# Patient Record
Sex: FEMALE | Race: WHITE | Employment: FULL TIME | URBAN - METROPOLITAN AREA
[De-identification: names, ages, dates, MRNs, and addresses within clinical notes are randomized per-mention and may not be internally consistent; named-entity substitution may affect disease eponyms.]

---

## 2019-05-08 ENCOUNTER — HOSPITAL ENCOUNTER (OUTPATIENT)
Dept: ULTRASOUND IMAGING | Age: 29
Discharge: HOME | End: 2019-05-08
Attending: NURSE PRACTITIONER
Payer: COMMERCIAL

## 2019-05-08 DIAGNOSIS — N85.2 HYPERTROPHY OF UTERUS: ICD-10-CM

## 2019-05-08 PROCEDURE — 76830 TRANSVAGINAL US NON-OB: CPT

## 2019-05-08 PROCEDURE — 76856 US EXAM PELVIC COMPLETE: CPT

## 2021-11-21 ENCOUNTER — TRANSCRIBE ORDER (OUTPATIENT)
Dept: REGISTRATION | Age: 31
End: 2021-11-21

## 2021-11-21 DIAGNOSIS — Z01.812 PRE-PROCEDURE LAB EXAM: Primary | ICD-10-CM

## 2021-11-26 ENCOUNTER — HOSPITAL ENCOUNTER (OUTPATIENT)
Dept: PREADMISSION TESTING | Age: 31
Discharge: HOME | End: 2021-11-26
Attending: OBSTETRICS & GYNECOLOGY
Payer: COMMERCIAL

## 2021-11-26 DIAGNOSIS — Z01.812 PRE-PROCEDURE LAB EXAM: ICD-10-CM

## 2021-11-26 PROCEDURE — U0005 INFEC AGEN DETEC AMPLI PROBE: HCPCS

## 2021-11-28 LAB
SARS-COV-2, XPLCVT: NOT DETECTED
SOURCE, COVRS: NORMAL

## 2021-11-30 ENCOUNTER — ANESTHESIA EVENT (OUTPATIENT)
Dept: SURGERY | Age: 31
End: 2021-11-30
Payer: COMMERCIAL

## 2021-12-01 ENCOUNTER — ANESTHESIA (OUTPATIENT)
Dept: SURGERY | Age: 31
End: 2021-12-01
Payer: COMMERCIAL

## 2021-12-01 ENCOUNTER — HOSPITAL ENCOUNTER (OUTPATIENT)
Age: 31
Setting detail: OUTPATIENT SURGERY
Discharge: HOME | End: 2021-12-01
Attending: OBSTETRICS & GYNECOLOGY | Admitting: OBSTETRICS & GYNECOLOGY
Payer: COMMERCIAL

## 2021-12-01 VITALS
TEMPERATURE: 98 F | BODY MASS INDEX: 39.99 KG/M2 | HEART RATE: 66 BPM | WEIGHT: 240 LBS | DIASTOLIC BLOOD PRESSURE: 63 MMHG | HEIGHT: 65 IN | RESPIRATION RATE: 17 BRPM | OXYGEN SATURATION: 100 % | SYSTOLIC BLOOD PRESSURE: 113 MMHG

## 2021-12-01 LAB
HCG UR QL: NEGATIVE
HGB BLD-MCNC: 10.5 G/DL (ref 11.5–16)

## 2021-12-01 PROCEDURE — 88305 TISSUE EXAM BY PATHOLOGIST: CPT

## 2021-12-01 PROCEDURE — 76210000006 HC OR PH I REC 0.5 TO 1 HR: Performed by: OBSTETRICS & GYNECOLOGY

## 2021-12-01 PROCEDURE — 77030026236 HC DEV TISS RMVL MYOSUR HOLO -G1: Performed by: OBSTETRICS & GYNECOLOGY

## 2021-12-01 PROCEDURE — 77030040922 HC BLNKT HYPOTHRM STRY -A

## 2021-12-01 PROCEDURE — 85018 HEMOGLOBIN: CPT

## 2021-12-01 PROCEDURE — 74011250637 HC RX REV CODE- 250/637: Performed by: ANESTHESIOLOGY

## 2021-12-01 PROCEDURE — 74011250636 HC RX REV CODE- 250/636: Performed by: ANESTHESIOLOGY

## 2021-12-01 PROCEDURE — 77030003666 HC NDL SPINAL BD -A: Performed by: OBSTETRICS & GYNECOLOGY

## 2021-12-01 PROCEDURE — 76010000138 HC OR TIME 0.5 TO 1 HR: Performed by: OBSTETRICS & GYNECOLOGY

## 2021-12-01 PROCEDURE — 77030040361 HC SLV COMPR DVT MDII -B: Performed by: OBSTETRICS & GYNECOLOGY

## 2021-12-01 PROCEDURE — 81025 URINE PREGNANCY TEST: CPT

## 2021-12-01 PROCEDURE — 76210000021 HC REC RM PH II 0.5 TO 1 HR: Performed by: OBSTETRICS & GYNECOLOGY

## 2021-12-01 PROCEDURE — 77030019905 HC CATH URETH INTMIT MDII -A: Performed by: OBSTETRICS & GYNECOLOGY

## 2021-12-01 PROCEDURE — 74011000250 HC RX REV CODE- 250: Performed by: OBSTETRICS & GYNECOLOGY

## 2021-12-01 PROCEDURE — 74011000250 HC RX REV CODE- 250: Performed by: NURSE ANESTHETIST, CERTIFIED REGISTERED

## 2021-12-01 PROCEDURE — 74011250636 HC RX REV CODE- 250/636: Performed by: NURSE ANESTHETIST, CERTIFIED REGISTERED

## 2021-12-01 PROCEDURE — 77030041423 HC SYST FLUID MNGMT FLUENT HOLO -D: Performed by: OBSTETRICS & GYNECOLOGY

## 2021-12-01 PROCEDURE — 2709999900 HC NON-CHARGEABLE SUPPLY: Performed by: OBSTETRICS & GYNECOLOGY

## 2021-12-01 PROCEDURE — 76060000033 HC ANESTHESIA 1 TO 1.5 HR: Performed by: OBSTETRICS & GYNECOLOGY

## 2021-12-01 RX ORDER — OXYCODONE AND ACETAMINOPHEN 5; 325 MG/1; MG/1
1 TABLET ORAL AS NEEDED
Status: DISCONTINUED | OUTPATIENT
Start: 2021-12-01 | End: 2021-12-01 | Stop reason: HOSPADM

## 2021-12-01 RX ORDER — PROPOFOL 10 MG/ML
INJECTION, EMULSION INTRAVENOUS AS NEEDED
Status: DISCONTINUED | OUTPATIENT
Start: 2021-12-01 | End: 2021-12-01 | Stop reason: HOSPADM

## 2021-12-01 RX ORDER — FENTANYL CITRATE 50 UG/ML
50 INJECTION, SOLUTION INTRAMUSCULAR; INTRAVENOUS AS NEEDED
Status: DISCONTINUED | OUTPATIENT
Start: 2021-12-01 | End: 2021-12-01 | Stop reason: HOSPADM

## 2021-12-01 RX ORDER — SODIUM CHLORIDE 0.9 % (FLUSH) 0.9 %
5-40 SYRINGE (ML) INJECTION AS NEEDED
Status: DISCONTINUED | OUTPATIENT
Start: 2021-12-01 | End: 2021-12-01 | Stop reason: HOSPADM

## 2021-12-01 RX ORDER — KETOROLAC TROMETHAMINE 30 MG/ML
INJECTION, SOLUTION INTRAMUSCULAR; INTRAVENOUS AS NEEDED
Status: DISCONTINUED | OUTPATIENT
Start: 2021-12-01 | End: 2021-12-01 | Stop reason: HOSPADM

## 2021-12-01 RX ORDER — MIDAZOLAM HYDROCHLORIDE 1 MG/ML
1 INJECTION, SOLUTION INTRAMUSCULAR; INTRAVENOUS AS NEEDED
Status: DISCONTINUED | OUTPATIENT
Start: 2021-12-01 | End: 2021-12-01 | Stop reason: HOSPADM

## 2021-12-01 RX ORDER — SODIUM CHLORIDE 9 MG/ML
50 INJECTION, SOLUTION INTRAVENOUS CONTINUOUS
Status: DISCONTINUED | OUTPATIENT
Start: 2021-12-01 | End: 2021-12-01 | Stop reason: HOSPADM

## 2021-12-01 RX ORDER — ONDANSETRON 2 MG/ML
INJECTION INTRAMUSCULAR; INTRAVENOUS AS NEEDED
Status: DISCONTINUED | OUTPATIENT
Start: 2021-12-01 | End: 2021-12-01 | Stop reason: HOSPADM

## 2021-12-01 RX ORDER — BUPIVACAINE HYDROCHLORIDE AND EPINEPHRINE 5; 5 MG/ML; UG/ML
INJECTION, SOLUTION EPIDURAL; INTRACAUDAL; PERINEURAL AS NEEDED
Status: DISCONTINUED | OUTPATIENT
Start: 2021-12-01 | End: 2021-12-01 | Stop reason: HOSPADM

## 2021-12-01 RX ORDER — EPHEDRINE SULFATE/0.9% NACL/PF 50 MG/5 ML
5 SYRINGE (ML) INTRAVENOUS AS NEEDED
Status: DISCONTINUED | OUTPATIENT
Start: 2021-12-01 | End: 2021-12-01 | Stop reason: HOSPADM

## 2021-12-01 RX ORDER — ONDANSETRON 2 MG/ML
4 INJECTION INTRAMUSCULAR; INTRAVENOUS AS NEEDED
Status: DISCONTINUED | OUTPATIENT
Start: 2021-12-01 | End: 2021-12-01 | Stop reason: HOSPADM

## 2021-12-01 RX ORDER — SODIUM CHLORIDE 0.9 % (FLUSH) 0.9 %
5-40 SYRINGE (ML) INJECTION EVERY 8 HOURS
Status: DISCONTINUED | OUTPATIENT
Start: 2021-12-01 | End: 2021-12-01 | Stop reason: HOSPADM

## 2021-12-01 RX ORDER — SODIUM CHLORIDE 9 MG/ML
1000 INJECTION, SOLUTION INTRAVENOUS CONTINUOUS
Status: DISCONTINUED | OUTPATIENT
Start: 2021-12-01 | End: 2021-12-01 | Stop reason: HOSPADM

## 2021-12-01 RX ORDER — MIDAZOLAM HYDROCHLORIDE 1 MG/ML
INJECTION, SOLUTION INTRAMUSCULAR; INTRAVENOUS AS NEEDED
Status: DISCONTINUED | OUTPATIENT
Start: 2021-12-01 | End: 2021-12-01 | Stop reason: HOSPADM

## 2021-12-01 RX ORDER — FENTANYL CITRATE 50 UG/ML
25 INJECTION, SOLUTION INTRAMUSCULAR; INTRAVENOUS
Status: DISCONTINUED | OUTPATIENT
Start: 2021-12-01 | End: 2021-12-01 | Stop reason: HOSPADM

## 2021-12-01 RX ORDER — DEXAMETHASONE SODIUM PHOSPHATE 4 MG/ML
INJECTION, SOLUTION INTRA-ARTICULAR; INTRALESIONAL; INTRAMUSCULAR; INTRAVENOUS; SOFT TISSUE AS NEEDED
Status: DISCONTINUED | OUTPATIENT
Start: 2021-12-01 | End: 2021-12-01 | Stop reason: HOSPADM

## 2021-12-01 RX ORDER — LIDOCAINE HYDROCHLORIDE 10 MG/ML
0.1 INJECTION, SOLUTION EPIDURAL; INFILTRATION; INTRACAUDAL; PERINEURAL AS NEEDED
Status: DISCONTINUED | OUTPATIENT
Start: 2021-12-01 | End: 2021-12-01 | Stop reason: HOSPADM

## 2021-12-01 RX ORDER — LIDOCAINE HYDROCHLORIDE 20 MG/ML
INJECTION, SOLUTION EPIDURAL; INFILTRATION; INTRACAUDAL; PERINEURAL AS NEEDED
Status: DISCONTINUED | OUTPATIENT
Start: 2021-12-01 | End: 2021-12-01 | Stop reason: HOSPADM

## 2021-12-01 RX ORDER — SODIUM CHLORIDE, SODIUM LACTATE, POTASSIUM CHLORIDE, CALCIUM CHLORIDE 600; 310; 30; 20 MG/100ML; MG/100ML; MG/100ML; MG/100ML
125 INJECTION, SOLUTION INTRAVENOUS CONTINUOUS
Status: DISCONTINUED | OUTPATIENT
Start: 2021-12-01 | End: 2021-12-01 | Stop reason: HOSPADM

## 2021-12-01 RX ORDER — PROPOFOL 10 MG/ML
INJECTION, EMULSION INTRAVENOUS
Status: DISCONTINUED | OUTPATIENT
Start: 2021-12-01 | End: 2021-12-01 | Stop reason: HOSPADM

## 2021-12-01 RX ORDER — MIDAZOLAM HYDROCHLORIDE 1 MG/ML
0.5 INJECTION, SOLUTION INTRAMUSCULAR; INTRAVENOUS
Status: DISCONTINUED | OUTPATIENT
Start: 2021-12-01 | End: 2021-12-01 | Stop reason: HOSPADM

## 2021-12-01 RX ORDER — SODIUM CHLORIDE, SODIUM LACTATE, POTASSIUM CHLORIDE, CALCIUM CHLORIDE 600; 310; 30; 20 MG/100ML; MG/100ML; MG/100ML; MG/100ML
INJECTION, SOLUTION INTRAVENOUS
Status: DISCONTINUED | OUTPATIENT
Start: 2021-12-01 | End: 2021-12-01 | Stop reason: HOSPADM

## 2021-12-01 RX ORDER — DIPHENHYDRAMINE HYDROCHLORIDE 50 MG/ML
12.5 INJECTION, SOLUTION INTRAMUSCULAR; INTRAVENOUS AS NEEDED
Status: DISCONTINUED | OUTPATIENT
Start: 2021-12-01 | End: 2021-12-01 | Stop reason: HOSPADM

## 2021-12-01 RX ORDER — PHENYLEPHRINE HCL IN 0.9% NACL 0.4MG/10ML
SYRINGE (ML) INTRAVENOUS AS NEEDED
Status: DISCONTINUED | OUTPATIENT
Start: 2021-12-01 | End: 2021-12-01 | Stop reason: HOSPADM

## 2021-12-01 RX ORDER — HYDROMORPHONE HYDROCHLORIDE 1 MG/ML
0.2 INJECTION, SOLUTION INTRAMUSCULAR; INTRAVENOUS; SUBCUTANEOUS
Status: DISCONTINUED | OUTPATIENT
Start: 2021-12-01 | End: 2021-12-01 | Stop reason: HOSPADM

## 2021-12-01 RX ORDER — ACETAMINOPHEN 325 MG/1
650 TABLET ORAL ONCE
Status: COMPLETED | OUTPATIENT
Start: 2021-12-01 | End: 2021-12-01

## 2021-12-01 RX ORDER — MORPHINE SULFATE 2 MG/ML
2 INJECTION, SOLUTION INTRAMUSCULAR; INTRAVENOUS
Status: DISCONTINUED | OUTPATIENT
Start: 2021-12-01 | End: 2021-12-01 | Stop reason: HOSPADM

## 2021-12-01 RX ADMIN — LIDOCAINE HYDROCHLORIDE 40 MG: 20 INJECTION, SOLUTION EPIDURAL; INFILTRATION; INTRACAUDAL; PERINEURAL at 11:18

## 2021-12-01 RX ADMIN — ACETAMINOPHEN 650 MG: 325 TABLET ORAL at 10:41

## 2021-12-01 RX ADMIN — PROPOFOL 10 MG: 10 INJECTION, EMULSION INTRAVENOUS at 11:22

## 2021-12-01 RX ADMIN — DEXAMETHASONE SODIUM PHOSPHATE 4 MG: 4 INJECTION, SOLUTION INTRAMUSCULAR; INTRAVENOUS at 11:38

## 2021-12-01 RX ADMIN — Medication 80 MCG: at 11:53

## 2021-12-01 RX ADMIN — SODIUM CHLORIDE, SODIUM LACTATE, POTASSIUM CHLORIDE, AND CALCIUM CHLORIDE: 600; 310; 30; 20 INJECTION, SOLUTION INTRAVENOUS at 11:13

## 2021-12-01 RX ADMIN — PROPOFOL 20 MG: 10 INJECTION, EMULSION INTRAVENOUS at 11:32

## 2021-12-01 RX ADMIN — KETOROLAC TROMETHAMINE 30 MG: 30 INJECTION, SOLUTION INTRAMUSCULAR; INTRAVENOUS at 12:02

## 2021-12-01 RX ADMIN — PROPOFOL 100 MCG/KG/MIN: 10 INJECTION, EMULSION INTRAVENOUS at 11:19

## 2021-12-01 RX ADMIN — PROPOFOL 40 MG: 10 INJECTION, EMULSION INTRAVENOUS at 11:18

## 2021-12-01 RX ADMIN — Medication 40 MCG: at 11:58

## 2021-12-01 RX ADMIN — MIDAZOLAM 2 MG: 1 INJECTION INTRAMUSCULAR; INTRAVENOUS at 11:13

## 2021-12-01 RX ADMIN — PROPOFOL 75 MG: 10 INJECTION, EMULSION INTRAVENOUS at 12:01

## 2021-12-01 RX ADMIN — ONDANSETRON HYDROCHLORIDE 4 MG: 2 INJECTION, SOLUTION INTRAMUSCULAR; INTRAVENOUS at 11:59

## 2021-12-01 RX ADMIN — SODIUM CHLORIDE, POTASSIUM CHLORIDE, SODIUM LACTATE AND CALCIUM CHLORIDE 125 ML/HR: 600; 310; 30; 20 INJECTION, SOLUTION INTRAVENOUS at 10:32

## 2021-12-01 NOTE — H&P
Gynecology History and Physical    Name: Latasha Harden MRN: [id number] SSN: [id number]    YOB: 1990  Age: 36 y.o. Sex: female       Subjective:      Chief complaint:  Abnormal uterine bleeding    Oni Richardson is a 36 y.o.  female with a history of abnormal uterine bleeding and recurrent miscarriage. She has failed OCPs and lysteda. An ultrasound last month showed mildly thickened endometrium at 14mm but no clear intracavitary lesion (although poor SIS fill was noted). She is interested in conception in the near future. She is admitted for Procedure(s) (LRB):  HYSTEROSCOPY, MYOSURE POSSIBLE DILATION AND CURETTAGE (N/A). The current method of family planning is none. No past medical history on file. Past Surgical History:   Procedure Laterality Date    HX OTHER SURGICAL      HX WISDOM TEETH EXTRACTION       No Known Allergies  Prior to Admission medications    Medication Sig Start Date End Date Taking? Authorizing Provider   PNV no.24-iron-folic acid-dha -426 mg-mcg-mg cmpk Take  by mouth. Provider, Historical      No family history on file.   OB History        3    Para   1    Term   1            AB   2    Living   1       SAB   2    IAB        Ectopic        Molar        Multiple   0    Live Births   1              Social History     Socioeconomic History    Marital status:      Spouse name: Not on file    Number of children: Not on file    Years of education: Not on file    Highest education level: Not on file   Occupational History    Not on file   Tobacco Use    Smoking status: Never Smoker    Smokeless tobacco: Not on file   Substance and Sexual Activity    Alcohol use: No    Drug use: Not on file    Sexual activity: Yes     Partners: Male   Other Topics Concern    Not on file   Social History Narrative    Not on file     Social Determinants of Health     Financial Resource Strain:     Difficulty of Paying Living Expenses: Not on file   Food Insecurity:     Worried About Running Out of Food in the Last Year: Not on file    Sparkle of Food in the Last Year: Not on file   Transportation Needs:     Lack of Transportation (Medical): Not on file    Lack of Transportation (Non-Medical): Not on file   Physical Activity:     Days of Exercise per Week: Not on file    Minutes of Exercise per Session: Not on file   Stress:     Feeling of Stress : Not on file   Social Connections:     Frequency of Communication with Friends and Family: Not on file    Frequency of Social Gatherings with Friends and Family: Not on file    Attends Yazdanism Services: Not on file    Active Member of [address] [place] [address] [employer] or Organizations: Not on file    Attends Club or Organization Meetings: Not on file    Marital Status: Not on file   Intimate Partner Violence:     Fear of Current or Ex-Partner: Not on file    Emotionally Abused: Not on file    Physically Abused: Not on file    Sexually Abused: Not on file   Housing Stability:     Unable to Pay for Housing in the Last Year: Not on file    Number of [place] in the Last Year: Not on file    Unstable Housing in the Last Year: Not on file       Review of Systems:  A comprehensive review of systems was negative except for that written in the History of Present Illness. Objective: There were no vitals filed for this visit. General:  alert, cooperative, no distress, appears stated age   Skin:  Normal.   Eyes: negative   Mouth: MMM no lesions   Lymph Nodes:  Cervical, supraclavicular, and axillary nodes normal.   Lungs:  Normal effort   Heart:  regular rate and rhythm   Abdomen: soft, non-tender.  Bowel sounds normal. No masses,  no organomegaly   CVA:  absent   Genitourinary: exam deferred   Extremities:  extremities normal, atraumatic, no cyanosis or edema   Neurologic:  negative   Psychiatric:  non focal         Assessment:     Persistent AUB despite medical management    Plan:     Procedure(s) (LRB): HYSTEROSCOPY, MYOSURE POSSIBLE DILATION AND CURETTAGE (N/A)  Discussed the risks of surgery including the risks of bleeding, infection, deep vein thrombosis, and surgical injuries to internal organs including but not limited to the bowels, bladder, rectum, and female reproductive organs. The patient understands the risks; any and all questions were answered to the patient's satisfaction. The patient was counseled at length about the risks of josephine Covid-19 during their perioperative period and any recovery window from their procedure. The patient was made aware that josephine Covid-19  may worsen their prognosis for recovering from their procedure and lend to a higher morbidity and/or mortality risk. All material risks, benefits, and reasonable alternatives including postponing the procedure were discussed. The patient does  wish to proceed with the procedure at this time.       Signed By:  Amelie Gallardo MD     December 1, 2021

## 2021-12-01 NOTE — DISCHARGE INSTRUCTIONS
After Care Instructions For Your Hysteroscopy, D&C      1. You may resume your usual diet once the nausea resolves. Initially, try sips of warm fluids and a bland diet. 2. Avoid heavy lifting and straining. Gradually increase your activity. First, try walking and doing light activity around the house. Resume your normal habits if no significant discomfort or bleeding develops. Most women can return to work within one to four days after this procedure. 3. You may take showers. Avoid using a tub bath, swimming pool or hot tub until after your check-up. 4. Do not place anything in your vagina until after your postoperative visit. Do not   douche, use tampons, or have intercourse because this may cause bleeding and   infection. 5. You may initially experience a heavy bloody discharge. This should not be more than your menstrual flow. Over the next several days, the flow should steadily decrease. 6. Typically following the procedure, there is little or no pain. You may feel cramps in your lower abdomen. Tylenol may relieve mild cramping. If pain medication does not improve your symptoms, you should contact your physician. 7. Contact the office if you have excessive bleeding (saturating a pad an hour for two hours or passing large clots). It is also necessary to speak with your physician if you develop chills, a temperature greater than 100.4, difficulty voiding or burning on urination. 8. Your physician may want to see you in the office after your D&C. Please call for an appointment if this has not already been arranged. Our office phone number is [phone].  If appropriate, the microscopic results from your procedure will be discussed at this follow-up visit.          ______________________________________________________________________    Anesthesia Discharge Instructions    After general anesthesia or intervenous sedation, for 24 hours or while taking prescription Narcotics:  · Limit your activities  · Do not drive or operate hazardous machinery  · If you have not urinated within 8 hours after discharge, please contact your surgeon on call. · Do not make important personal or business decisions  · Do not drink alcoholic beverages    Report the following to your surgeon:  · Excessive pain, swelling, redness or odor of or around the surgical area  · Temperature over 100.5 degrees  · Nausea and vomiting lasting longer than 4 hours or if unable to take medication  · Any signs of decreased circulation or nerve impairment to extremity:  Change in color, persistent numbness, tingling, coldness or increased pain. · Any questions call Dr. Vinay Castellon office.

## 2021-12-01 NOTE — BRIEF OP NOTE
Brief Postoperative Note    Patient: Savanah Reyes  YOB: 1990  MRN: [id number]    Date of Procedure: 12/1/2021     Pre-Op Diagnosis: ABNORMAL UTERINE BLEEDING    Post-Op Diagnosis: same with very thickened endometrium    Procedure(s):   HYSTEROSCOPIC myosure directed biopsies, DILATION AND CURETTAGE    Surgeon(s):  Brendan Ruff MD    Surgical Assistant: None    Anesthesia: General     Estimated Blood Loss (mL): 08DQ    Complications: None    Specimens:   ID Type Source Tests Collected by Time Destination   1 : ENDOMETRIAL CURETTINGS Fresh Uterus  Brendan Ruff MD 12/1/2021 1157 Pathology        Implants: * No implants in log *    Drains: * No LDAs found *    Findings: enlarged uterus, sounded to 10cm, significantly thickened endometrium with diffuse polypoid appearance, normal ostia bilaterally    Electronically Signed by Pierce Gupta MD on 12/1/2021 at 12:05 PM

## 2021-12-01 NOTE — OP NOTES
HYSTEROSCOPIC MYOSURE DIRECTED BIOPSIES, D & C FULL OP NOTE      Catalino Reyes    DATE OF PROCEDURE:  12/1/2021    PREOPERATIVE DIAGNOSIS:  ABNORMAL UTERINE BLEEDING    POSTOPERATIVE DIAGNOSIS:  ABNORMAL UTERINE BLEEDING, THICKENED ENDOMETRIUM    PROCEDURE: HYSTEROSCOPIC MYOSURE DIRECTED BIOPSIES, D & C    SURGEON:  Colby Billingsley MD    ANESTHESIA: MAC    EBL:  25ML    FINDINGS: enlarged uterus, sounded to 10cm, significantly thickened endometrium with diffuse polypoid appearance, normal ostia bilaterally    Specimen:  Endometrial curettings    PROCEDURE: Patient was placed on the operating table in the supine position. Time out was done to confirm the operating procedure, surgeon, patient and site. Once confirmed by the team, procedure was started. Patient was placed under anesthesia. She was prepped and draped in the usual fashion for vaginal surgery. Bladder was drained. Cervix was visualized with the aid of a sidearm speculum and grasped with a single-tooth tenaculum. Pericervical block with 0.5% marcaine w/ epi injected. Uterus sounded to 10cm. The cervix was then dilated to 6mm.     The diagnostic hysteroscope was then placed in the endometrial cavity. Findings were noted as above. The myosure device was then introduced into the uterus and the significantly thickened endometrium was removed without difficulty until flush with uterine wall. The uterus was reinspected and cavity and endocervix was normal and with minimal bleeding.      Finally thorough endometrial curettage was performed with endometrial curettings being sent for histologic review. Good uterine crye is noted in all quadrants. Instruments were removed from the vagina. Fluid deficit 160ml.     All counts were correct. Pt tolerated the procedure well and was taken to the recovery room in good condition.

## 2021-12-01 NOTE — ANESTHESIA POSTPROCEDURE EVALUATION
Procedure(s): HYSTEROSCOPY, MYOSURE, DILATION AND CURETTAGE. MAC    Anesthesia Post Evaluation      Multimodal analgesia: multimodal analgesia not used between 6 hours prior to anesthesia start to PACU discharge  Patient location during evaluation: PACU  Patient participation: complete - patient participated  Level of consciousness: awake  Pain score: 0  Pain management: adequate  Airway patency: patent  Anesthetic complications: no  Cardiovascular status: acceptable  Respiratory status: acceptable  Hydration status: acceptable  Comments: I have evaluated the patient and meets criteria for discharge from PACU. Haydee Vega MD    Final Post Anesthesia Temperature Assessment:  Normothermia (36.0-37.5 degrees C)      INITIAL Post-op Vital signs:   Vitals Value Taken Time   /63 12/01/21 1300   Temp 36.7 °C (98 °F) 12/01/21 1215   Pulse 62 12/01/21 1303   Resp 16 12/01/21 1303   SpO2 100 % 12/01/21 1303   Vitals shown include unvalidated device data.

## 2022-04-18 ENCOUNTER — OFFICE VISIT (OUTPATIENT)
Dept: ORTHOPEDIC SURGERY | Age: 32
End: 2022-04-18
Payer: COMMERCIAL

## 2022-04-18 VITALS — WEIGHT: 240 LBS | BODY MASS INDEX: 38.57 KG/M2 | HEIGHT: 66 IN

## 2022-04-18 DIAGNOSIS — M76.899 HAMSTRING TENDONITIS AT ORIGIN: Primary | ICD-10-CM

## 2022-04-18 PROCEDURE — 99204 OFFICE O/P NEW MOD 45 MIN: CPT | Performed by: ORTHOPAEDIC SURGERY

## 2022-04-18 RX ORDER — MEDROXYPROGESTERONE ACETATE 10 MG/1
TABLET ORAL
COMMUNITY
Start: 2021-12-07

## 2022-04-18 RX ORDER — MELOXICAM 15 MG/1
15 TABLET ORAL DAILY
Qty: 30 TABLET | Refills: 0 | Status: SHIPPED | OUTPATIENT
Start: 2022-04-18

## 2022-04-18 NOTE — PROGRESS NOTES
Vincent Cabot Wilmer (: 1990) is a 36 y.o. female patient, here for evaluation of the following chief complaint(s):  Hip Pain (right hip pain)       ASSESSMENT/PLAN:  Below is the assessment and plan developed based on review of pertinent history, physical exam, labs, studies, and medications. 33-year-old female comes in today for evaluation of point tenderness over her right buttock. Symptoms started after she had a fall in the snow about 5 weeks ago. Sitting makes symptoms worse. Has been tried some over-the-counter medications in the last couple weeks with no real improvement. Symptoms most likely consistent with hamstring tendinitis versus possible partial tearing. She has had some improvement, plans to continue to monitor symptoms. Prescription meloxicam given to help with discomfort. Patient plans to run this by her fertility doctor prior to starting. Verbalizes understanding. Prescription given for physical therapy. If no improvement in symptoms over the next 3 to 4 weeks would consider getting an MRI for further evaluation. Follow-up sooner as needed. 1. Hamstring tendonitis at origin  -     meloxicam (MOBIC) 15 mg tablet; Take 1 Tablet by mouth daily. , Normal, Disp-30 Tablet, R-0  -     REFERRAL TO PHYSICAL THERAPY      Encounter Diagnosis   Name Primary?  Hamstring tendonitis at origin Yes        No follow-ups on file. SUBJECTIVE/OBJECTIVE:  Vincent Cabot Wilmer (: 1990) is a 36 y.o. female who presents today for the following:  Chief Complaint   Patient presents with    Hip Pain     right hip pain       33-year-old female comes in today for evaluation of right buttock pain. She had a fall in the snow about 5 weeks ago. Pain started at that time. Sitting makes symptoms worse. Still able to walk however does have a slight limp. Rates pain is moderate to severe. Is taking some over-the-counter medications with no relief in symptoms.     IMAGING:  XR Results (most recent):  No results found for this or any previous visit. No Known Allergies    Current Outpatient Medications   Medication Sig    medroxyPROGESTERone (Provera) 10 mg tablet Provera 10 mg tablet   1 tab PO daily    PRENATAL VIT 10-IRON-FOLIC-DHA PO Take 1 Tablet by mouth daily.  meloxicam (MOBIC) 15 mg tablet Take 1 Tablet by mouth daily.  PNV no.24-iron-folic acid-dha -106 mg-mcg-mg cmpk Take  by mouth. (Patient not taking: Reported on 4/18/2022)     No current facility-administered medications for this visit. No past medical history on file. Past Surgical History:   Procedure Laterality Date    HX OTHER SURGICAL      HX WISDOM TEETH EXTRACTION  2004       No family history on file. Social History     Tobacco Use    Smoking status: Never Smoker    Smokeless tobacco: Not on file   Substance Use Topics    Alcohol use: No        All systems reviewed x 12 and were negative with the exception of None      No flowsheet data found. Vitals:  Ht 5' 6\" (1.676 m)   Wt 240 lb (108.9 kg)   BMI 38.74 kg/m²    Body mass index is 38.74 kg/m². Physical Exam    General: NAD, well developed, well nourished. Cardiac: Extremities well perfused. Respiratory: Nonlabored breathing. RLE: No antalgic gait. Negative stinchfield. 0-100 degrees of flexion. >20 degrees internal rotation, >30 degrees external rotation. Negative CHICA. No pain with flexion adduction and internal rotation. .  Motor strength grossly intact. LLE: Mild antalgic gait. Negative stinchfield. 0-100 degrees of flexion. >20 degrees internal rotation, >30 degrees external rotation. Negative CHICA. No pain with flexion adduction and internal rotation. Point tenderness over ischium. Motor strength grossly intact. Skin: Warm well perfused. Vascular: Palpable pedal pulses bilaterally. Equal. Capillary refill less than 2 seconds.     Capo Villalpando M.D. was available for immediate consultation as the supervising physician. An electronic signature was used to authenticate this note.   -- Analia Quintero PA-C

## 2022-09-16 LAB
ANTIBODY SCREEN, EXTERNAL: NEGATIVE
CHLAMYDIA, EXTERNAL: NEGATIVE
HBSAG, EXTERNAL: NORMAL
HEPATITIS C AB,   EXT: NEGATIVE
HIV, EXTERNAL: NORMAL
N. GONORRHEA, EXTERNAL: NEGATIVE
RUBELLA, EXTERNAL: NORMAL
T. PALLIDUM, EXTERNAL: NORMAL
TYPE, ABO & RH, EXTERNAL: NORMAL

## 2023-01-09 LAB — ANTIBODY SCREEN, EXTERNAL: NEGATIVE

## 2023-03-07 LAB — GRBS, EXTERNAL: POSITIVE

## 2023-03-26 NOTE — PROGRESS NOTES
Patient called for Pre-Admission Testing (PAT) for scheduled induction. Questions answered and to arrive at 1600 on 3/27/23.

## 2023-03-27 ENCOUNTER — HOSPITAL ENCOUNTER (INPATIENT)
Age: 33
LOS: 3 days | Discharge: HOME | End: 2023-03-30
Attending: STUDENT IN AN ORGANIZED HEALTH CARE EDUCATION/TRAINING PROGRAM | Admitting: OBSTETRICS & GYNECOLOGY
Payer: COMMERCIAL

## 2023-03-27 DIAGNOSIS — Z98.891 S/P CESAREAN SECTION: Primary | ICD-10-CM

## 2023-03-27 PROBLEM — Z3A.39 39 WEEKS GESTATION OF PREGNANCY: Status: ACTIVE | Noted: 2023-03-27

## 2023-03-27 LAB
ERYTHROCYTE [DISTWIDTH] IN BLOOD BY AUTOMATED COUNT: 14.5 % (ref 11.5–14.5)
HCT VFR BLD AUTO: 31.1 % (ref 35–47)
HGB BLD-MCNC: 10.1 G/DL (ref 11.5–16)
MCH RBC QN AUTO: 25.1 PG (ref 26–34)
MCHC RBC AUTO-ENTMCNC: 32.5 G/DL (ref 30–36.5)
MCV RBC AUTO: 77.2 FL (ref 80–99)
NRBC # BLD: 0 K/UL (ref 0–0.01)
NRBC BLD-RTO: 0 PER 100 WBC
PLATELET # BLD AUTO: 281 K/UL (ref 150–400)
PMV BLD AUTO: 11 FL (ref 8.9–12.9)
RBC # BLD AUTO: 4.03 M/UL (ref 3.8–5.2)
WBC # BLD AUTO: 8.9 K/UL (ref 3.6–11)

## 2023-03-27 PROCEDURE — 65270000029 HC RM PRIVATE

## 2023-03-27 PROCEDURE — 4A1HXCZ MONITORING OF PRODUCTS OF CONCEPTION, CARDIAC RATE, EXTERNAL APPROACH: ICD-10-PCS | Performed by: STUDENT IN AN ORGANIZED HEALTH CARE EDUCATION/TRAINING PROGRAM

## 2023-03-27 PROCEDURE — 85027 COMPLETE CBC AUTOMATED: CPT

## 2023-03-27 PROCEDURE — 36415 COLL VENOUS BLD VENIPUNCTURE: CPT

## 2023-03-27 PROCEDURE — 86900 BLOOD TYPING SEROLOGIC ABO: CPT

## 2023-03-27 RX ORDER — GUAIFENESIN 100 MG/5ML
81 LIQUID (ML) ORAL DAILY
COMMUNITY
End: 2023-03-30

## 2023-03-27 RX ORDER — SODIUM CHLORIDE 0.9 % (FLUSH) 0.9 %
5-40 SYRINGE (ML) INJECTION AS NEEDED
Status: DISCONTINUED | OUTPATIENT
Start: 2023-03-27 | End: 2023-03-28 | Stop reason: HOSPADM

## 2023-03-27 RX ORDER — SODIUM CHLORIDE, SODIUM LACTATE, POTASSIUM CHLORIDE, CALCIUM CHLORIDE 600; 310; 30; 20 MG/100ML; MG/100ML; MG/100ML; MG/100ML
125 INJECTION, SOLUTION INTRAVENOUS CONTINUOUS
Status: DISCONTINUED | OUTPATIENT
Start: 2023-03-27 | End: 2023-03-29

## 2023-03-27 RX ORDER — OXYTOCIN/RINGER'S LACTATE 30/500 ML
87.3 PLASTIC BAG, INJECTION (ML) INTRAVENOUS AS NEEDED
Status: DISCONTINUED | OUTPATIENT
Start: 2023-03-27 | End: 2023-03-30 | Stop reason: HOSPADM

## 2023-03-27 RX ORDER — LANOLIN ALCOHOL/MO/W.PET/CERES
400 CREAM (GRAM) TOPICAL DAILY
COMMUNITY
End: 2023-03-30

## 2023-03-27 RX ORDER — OXYTOCIN/RINGER'S LACTATE 30/500 ML
10 PLASTIC BAG, INJECTION (ML) INTRAVENOUS AS NEEDED
Status: DISCONTINUED | OUTPATIENT
Start: 2023-03-27 | End: 2023-03-30 | Stop reason: HOSPADM

## 2023-03-27 RX ORDER — SODIUM CHLORIDE 0.9 % (FLUSH) 0.9 %
5-40 SYRINGE (ML) INJECTION EVERY 8 HOURS
Status: DISCONTINUED | OUTPATIENT
Start: 2023-03-27 | End: 2023-03-28 | Stop reason: HOSPADM

## 2023-03-27 RX ORDER — BUTORPHANOL TARTRATE 1 MG/ML
2 INJECTION INTRAMUSCULAR; INTRAVENOUS
Status: DISCONTINUED | OUTPATIENT
Start: 2023-03-27 | End: 2023-03-28

## 2023-03-27 RX ORDER — TERBUTALINE SULFATE 1 MG/ML
0.25 INJECTION SUBCUTANEOUS AS NEEDED
Status: DISCONTINUED | OUTPATIENT
Start: 2023-03-27 | End: 2023-03-28 | Stop reason: HOSPADM

## 2023-03-27 RX ORDER — OXYTOCIN/RINGER'S LACTATE 30/500 ML
0-20 PLASTIC BAG, INJECTION (ML) INTRAVENOUS
Status: DISCONTINUED | OUTPATIENT
Start: 2023-03-28 | End: 2023-03-28 | Stop reason: HOSPADM

## 2023-03-27 RX ORDER — ONDANSETRON 2 MG/ML
4 INJECTION INTRAMUSCULAR; INTRAVENOUS
Status: DISCONTINUED | OUTPATIENT
Start: 2023-03-27 | End: 2023-03-28 | Stop reason: HOSPADM

## 2023-03-27 NOTE — H&P
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 L&D/cali/Nico, 3/28/23-6am [facility] L&D/IOL/Freddie  History of Present Illness:  Pt is a 28 yo  at 39+0 for elective IOL. Pregnancy has been complicated by LGA fetus and intermittent malpresentation. At last growth EFW was 93% at 37w and baby was VTX at 38w BPP. Pt is GBS+    She was also noted to have complex atypical hyperplasia on D&C on . Plan is for PP EMB and Mirena. She denies vaginal bleeding, contractions, discharge, leaking, and decreased fetal movement. Assessment/Plan  1. Gestation period, 39 weeks -  Admit to L&D. Trujillo bulb for cervical ripening. Pitocin in AM.  GBS pos -- start abx with pitocin. EFM/Colburn.  AROM PRN. Epidural PRN. Anticipate .  Z3A.39: 39 weeks gestation of pregnancy    2. Breech presentation -  Vertex presentation confirmed on admission  O32. 1XX0: Maternal care for breech presentation, not applicable or unspecified    3. Large for gestation age fetus -  EFW 93%  O36.63X0: Maternal care for excessive fetal growth, third trimester, not applicable or unspecified    4. Body mass index 30+ - obesity  E82.271: Obesity complicating pregnancy, third trimester    5.  Group B Streptococcus carrier -  PCN in AM  O99.820: Streptococcus B carrier state complicating pregnancy    6. Complex endometrial hyperplasia -  For EMB/IUD postpartum. N85.00: Endometrial hyperplasia, unspecified      Return to Koko Salamanca MD for Surgery at [facility] on 03/28/2023 at 06:00 AM  for Return OB at [facility] on or around 04/03/2023  for US OB BPP at [facility] on or around 04/03/2023  Marky Keating MD for GYN Exam at [facility] on 05/09/2023 at 11:45 AM  Marky Keating MD for GYN Exam at [facility] on 05/22/2023 at 10:15 AM  Prenatal Flowsheet:  Fundus Pres FHR FM PLS Cervix Exam BP Wt Edema Glucose Protein Leukocytes Nitrite Ketones Blood   09/16/2022   11 wks 4 days                                          09/16/2022   11 wks 4 days   [email or web address]                         172 No   126/78 sitting 237 lbs none none 1+       Comments: Dr. Lucille Gomez pt. Dr. Robert Billingsley- IUI to conceive. Mat 21, CF and SMA today. Complex Atypical Hyerplasia on D&C path 12/21, EMB neg 2/22, 5/22. [address] bleed last week, has resolved on todays US. C/o nausea, fatigue, breast tenderness, frequent spotting since beginning of pregnancy. Denies vomiting, vision changes, and any other concerns. OB folder, nutrition, meds, nausea, deck/hosp reviewed. Has babyscripts. 10/06/2022   14 wks 3 days   [place]                         159 No   116/72 238 lbs none         Comments: RM 1B. Doing well overall. Pt reports that vaginal bleeding and spotting has stopped since last appointment. Has been feeling more tired. Denies N/V, swelling, HAs. reviewed histories and labs, discussed hospitalist/laborist system   10/20/2022   16 wks 3 days   [place]                         147 Yes   126/80 236 lbs none none neg       Comments: Rm 2B- Early Glucola and AFP today. Doing well. Denies any N/V, vision changes, bleeding, spotting, cramping. C/o mild HA today.  States she sometimes has a gooey like discharge. U/S next visit. 11/17/2022   20 wks 3 days   [place]                         140 Yes   118/78 236 lbs none         Comments: Rm 2B- Fetal Scan prior. Knew gender already- male; nl anatomy on prelim, post placenta. Doing well; Good FM. Denies any n/v, vision changes, bleeding/ spotting, cramping, or LOF. HA once since last visit. 12/01/2022   22 wks 3 days   [place]                         144 Yes   140/86  136/74 235 lbs none         Comments: Rm 3B- Doing well; Good FM. Denies any n/v, vision changes, bleeding, spotting, cramping, or LOF. HA this AM- took Tylenol. Dx with flu 11/26- feeling better. Took tamiflu. Precautions reviewed. 12/15/2022   24 wks 3 days   [place]                       25 cm  143 Yes   112/68 237 lbs none         Comments: Room 2B - Doing well. Good FM. Pt states she had a few headaches since her last visit. She also had some white fluid that was different from her normal discharge on two separate occasions in the last 2 weeks. Pt has chosen [facility]. Denies any N/V, vision changes, cramping, or bleeding/spotting. 01/09/2023   28 wks   [place]                       29 cm  141 Yes   116/70 237 lbs trace         Comments: Rm 1B - Doing well overall, active FM. Mild swelling in hands at night. Notes got very dizzy and light headed on Tuesday last week which nausea and HA that lasted until the next day; reports taking Tylenol to help with lingering HA. C/o minor LOF and mild cramping which occasionally cause lower abdominal pain. Mentions leg cramps at night as well. Denies VB, abnormal discharge, vomiting, pelvic pain, or contractions. Rh+, 28wk labs, reviewed hospital classes, choosing pediatrician and postpartum contraception, discussed [facility] and PTL precautions.  TDaP next visit (none in office today)   01/24/2023   30 wks 1 days   [place]                       31 cm  138 Yes   120/74 240 lbs trace Comments: Rm 2B- Doing well; Good FM. Some nausea. Denies any vomiting, vision changes, bleeding, spotting, cramping, or LOF. Occasional HAs. Tdap today. States on Fri/Sat- 6-7 hours no movement- tried eating/drinking. [facility] precautions reviewed. 02/06/2023   32 wks   [place]                       33 cm  156 Yes   118/72 241 lbs trace         Comments: RM 2B- Doing well; Good FM. Occasional nausea and HAs. Denies any vomiting, bleeding, spotting, cramping, or LOF. BHCs.   02/21/2023   34 wks 1 days   [place]                       36 cm  127 Yes   120/74 242 lbs trace none neg       Comments: Rm 3b. Doing well; Good FM. Occasional nausea and HAs. No LOF but some increased discharge. Lots of BHCs. Denies any vomiting, bleeding, spotting, cramping. Will add in pepcid to help with GERD symptoms. precautions reviewed, GBS and VScan next visit   03/07/2023   36 wks 1 days   [place]                       37 cm Vertex 140 Yes  0cm / 50% / -3 124/84 243 lbs trace         Comments: rm 1 - doing well! No headaches, nausea/vomiting. Unsure if she had any bleeding or spotting last night - states could have been a dream. Having spells of contractions - lasting about a minute each time for about an hour or so when they happen. Today the contractions have been about 3 minutes apart. Low risk cervix. Precautions reviewed. 03/14/2023   37 wks 1 days   [place]                       39 cm Transverse 148 Yes  1cm / 50% / -3 126/74 241 lbs trace         Comments: BPP 8/8 Rm 1B- U/S today - EFW 8#2 c/w 93%ile, MORELIA 20, BPP 8/8. Oblique lie with head in RUQ. On exam transverse and easily moveable. Will hold off on ECV for now and repeat imaging next week. Doing well; Good FM. Denied any n/v, HAs/vision changes, bleeding, spotting, or cramping. Some LOF yesterday. Desires cervical check. Frequent BHCs. Precautions reviewed. Desires 39wk IOL - will schedule for 4/28, discussed procedure and expectations. 03/21/2023   38 wks 1 days   [place]                       40 cm Vertex 151 Yes  2cm / 50% / -3 116/80 247 lbs trace         Comments: Room 1 B OB visit with ultrasound - BPP 8/8, MORELIA 14cm. denies headaches, n/v, no vaginal bleeding, some discharge. Precautions reviewed. IOL scheduled next week, reviewed procedure and expectations. 03/27/2023     [place]                                        Allergies: Allergies not reviewed (last reviewed 02/21/2023)     NKDA   Medications:  Medications not reviewed (last reviewed 03/21/2023)     aspirin 81 mg capsule  Take 1 capsule(s) every day by oral route.  02/21/23   entered    magnesium oxide 01/09/23   entered    Prenatal 10/14/21   entered    Vital Signs:  None recorded  Problems:  Reviewed Problems    Endometrial intraepithelial neoplasia - Onset: 12/06/2021 - diagnosed 12/2021    3/27/23-4pm [facility] L&D/cali/Nico, 3/28/23-6am [facility] L&D/CARMENZA/Freddie  Past Medical History  No significant past medical history Y      Family History:  Discussed Family History    Mother - Diabetes mellitus  - type 2   Maternal Grandmother - Malignant tumor of breast  - post menopausal dx   Maternal Grandfather - Myocardial infarction   Social History:  Discussed Social History    Substance Use  Do you or have you ever smoked tobacco?: Never smoker  Do you or have you ever used any other forms of tobacco or nicotine?: No  What was the date of your most recent tobacco screening?: 09/08/2021  What is your level of alcohol consumption?: Occasional (Notes: not since pregnant)  Do you use any illicit or recreational drugs?: No  What is your level of caffeine consumption?: None  Education and Occupation  Who is your employer?: [employer]  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 Mental Status Findings oriented to time, place, and person and appropriate mood. Head:Head: normocephalic. Respiratory:Lungs unlabored respiratory effort. Abdomen:Inspection and Palpation: gravid abdomen. Female :Cervix: as documented in prenatal flowsheet. Extremities:Extremities: no edema. Skin:General Skin no rash or suspicious lesions and normal general appearance. OB Labs    Result Value Ref.  Range Date Collected Date Reviewed Entered By Note   Initial Labs             Blood Type O  09/16/2022 09/23/2022 [email or web address]        D (Rh) Type Positive  09/16/2022 09/23/2022 [email or web address]        Antibody Screen Negative NEGATIVE 09/16/2022 09/23/2022 [email or web address]        Antibody Screen Negative NEGATIVE 01/09/2023 01/11/2023 [facility]        HCT - Initial 37.9 % 34.0-46.6 09/16/2022 09/23/2022 [email or web address]        HGB - Initial 13.0 G/DL 11.1-15.9 09/16/2022 09/23/2022 [email or web address]        MCV - Initial 85 FL 79-97 09/16/2022 09/23/2022 [email or web address]        PLT - Initial 245 X10E3/-450 09/16/2022 09/23/2022 [email or web address]        VDRL - Initial             Urine Culture/Screen COMMENT  09/16/2022 09/21/2022 [email or web address]        HBsAg Negative NEGATIVE 09/16/2022 09/23/2022 [email or web address]        HIV Counseling/Testing             Chlamydia - Initial Negative NEGATIVE 09/16/2022 09/21/2022 [email or web address]        Gonorrhea - Initial Negative NEGATIVE 09/16/2022 09/21/2022 [email or web address]        Varicella             Rubella 1.88 INDEX IMMUNE >0.99 09/16/2022 09/23/2022 [email or web address]    Optional Labs             HGB Electrophoresis             PPD/Quanta             Pap Test             Cystic Fibrosis   09/16/2022 09/23/2022 [email or web address]        HPV             [place]             Familial Dysautonomia             Genetic Screening Tests             NST             TSH             Drug screen             HCV Ab   09/16/2022 09/23/2022 [email or web address]        HCV RNA             Urinalysis             Rhogam Injection         8-20 Week Labs             Ultrasound - Initial             1st Trimester Aneuploidy Risk Assessment             MSAFP/Multiple Markers   10/20/2022 10/24/2022 [facility]        2nd Trimester Serum Screening             Amnio/CVS             Karyotype             Amniotic Fluid (AFP)         24-28 Week Labs             HCT - 24-28 Weeks 34.4 % 34.0-46.6 01/09/2023 01/11/2023 [facility]        HGB - 24-28 Weeks 11.2 G/DL 11.1-15.9 01/09/2023 01/11/2023 [facility]        MCV - 24-28 Weeks 85 FL 79-97 01/09/2023 01/11/2023 [facility]        PLT - 24-28 Weeks 253 X10E3/-450 01/09/2023 01/11/2023 [facility]        Diabetes Screen 131 MG/DL  10/20/2022 10/21/2022 [facility]        Diabetes Screen 112 MG/DL  01/09/2023 01/11/2023 [facility]        GTT (If Screen Abnormal)             D (Rh) Antibody Screen         32-36 Week Labs             HCT - 32-36 Weeks             HGB - 32-36 Weeks             MCV - 32-36 Weeks             PLT - 32-36 Weeks             Ultrasound - 32-36 Weeks             HIV (When Indicated)             VDRL - 32-36 Weeks             Gonorrhea - 32-36 Weeks             Chlamydia - 32-36 Weeks             Depression screening (when indicated)         35-37 Week Labs             Group B Strep             Resistance testing if penicillin allergic         Other Labs             Other - SPINAL MUSCULAR ATROPHY (SMA) MUTATIONS, BLOOD/TISSUE   09/16/2022 09/23/2022 [email or web address]    Vaccines  Vaccines not reviewed (last reviewed 10/20/2022)    Vaccine Type Date Amt. Route Site [address] Lot # Mfr. Exp.   Date VIS VIS  Given Vaccinator   COVID-19   COVID-19, mRNA, LNP-S, PF, 100 mcg/0.5 mL dose (Moderna) 03/09/21     [id number]                                                          Diphtheria, Tetanus, Pertussis   Tdap 01/24/23 0.5 mL Intramuscular Deltoid, Left   GlaxoSmithKline 05/04/25 Tdap 08/06/2021 01/24/23 Tiffany Hinton                                                     Measles, Mumps, Rubella   MMR 12/24/15     [id number]

## 2023-03-27 NOTE — PROGRESS NOTES
1600-Pt arrived to labor and delivery room 3 for elective induction of labor. G 5 P1. Denies any complications during pregnancy. States appropriate fetal movement. 1937-Dr Walsh at bedside, viewed strip. Discussing POC with patient. Bedside ultrasound done. Vertex presentation  [id number] [place] attempting to place cervical ripening balloon, cervix now 3-4 cms. Cervical ripening balloon out and plan is to start pitocin at 0400. Pt and  agree with plan. OK for intermittent monitoring and NST before bed. 1740-Pt off monitor to ambulate as desired. Denies feeling any pain or contractions. 1830-Pt sitting up in bed eating dinner, states occasional contraction. Patient's mother at bedside. 1935-Bedside and Verbal shift change report given to ED Hoyt RN  (oncoming nurse) by Lv Bolaños. Sylvia Campos RN  (offgoing nurse). Report included the following information SBAR.

## 2023-03-27 NOTE — PROGRESS NOTES
1935 Bedside and Verbal shift change report given to ED Torres (oncoming nurse) by Marcelina Monae (offgoing nurse). Report included the following information SBAR, Intake/Output, MAR, and Recent Results. 0215 Verbal shift change report given to CLAUDIA Acosta (oncoming nurse) by Radha Griffith (offgoing nurse). Report included the following information SBAR, Intake/Output, MAR, and Recent Results.

## 2023-03-28 ENCOUNTER — ANESTHESIA (OUTPATIENT)
Dept: LABOR AND DELIVERY | Age: 33
End: 2023-03-28
Payer: COMMERCIAL

## 2023-03-28 ENCOUNTER — ANESTHESIA EVENT (OUTPATIENT)
Dept: LABOR AND DELIVERY | Age: 33
End: 2023-03-28
Payer: COMMERCIAL

## 2023-03-28 LAB
ABO + RH BLD: NORMAL
BLOOD BANK CMNT PATIENT-IMP: NORMAL

## 2023-03-28 PROCEDURE — 3E033VJ INTRODUCTION OF OTHER HORMONE INTO PERIPHERAL VEIN, PERCUTANEOUS APPROACH: ICD-10-PCS | Performed by: STUDENT IN AN ORGANIZED HEALTH CARE EDUCATION/TRAINING PROGRAM

## 2023-03-28 PROCEDURE — 74011000250 HC RX REV CODE- 250: Performed by: STUDENT IN AN ORGANIZED HEALTH CARE EDUCATION/TRAINING PROGRAM

## 2023-03-28 PROCEDURE — 74011250637 HC RX REV CODE- 250/637: Performed by: OBSTETRICS & GYNECOLOGY

## 2023-03-28 PROCEDURE — 59200 INSERT CERVICAL DILATOR: CPT

## 2023-03-28 PROCEDURE — 76010000391 HC C SECN FIRST 1 HR: Performed by: STUDENT IN AN ORGANIZED HEALTH CARE EDUCATION/TRAINING PROGRAM

## 2023-03-28 PROCEDURE — 75410000003 HC RECOV DEL/VAG/CSECN EA 0.5 HR

## 2023-03-28 PROCEDURE — 74011250636 HC RX REV CODE- 250/636: Performed by: OBSTETRICS & GYNECOLOGY

## 2023-03-28 PROCEDURE — 75410000002 HC LABOR FEE PER 1 HR

## 2023-03-28 PROCEDURE — 76010000392 HC C SECN EA ADDL 0.5 HR: Performed by: STUDENT IN AN ORGANIZED HEALTH CARE EDUCATION/TRAINING PROGRAM

## 2023-03-28 PROCEDURE — 74011250636 HC RX REV CODE- 250/636: Performed by: ANESTHESIOLOGY

## 2023-03-28 PROCEDURE — 74011000250 HC RX REV CODE- 250: Performed by: ANESTHESIOLOGY

## 2023-03-28 PROCEDURE — 76060000078 HC EPIDURAL ANESTHESIA

## 2023-03-28 PROCEDURE — 74011000258 HC RX REV CODE- 258: Performed by: ANESTHESIOLOGY

## 2023-03-28 PROCEDURE — 77030014125 HC TY EPDRL BBMI -B: Performed by: ANESTHESIOLOGY

## 2023-03-28 PROCEDURE — 65410000002 HC RM PRIVATE OB

## 2023-03-28 PROCEDURE — 75410000003 HC RECOV DEL/VAG/CSECN EA 0.5 HR: Performed by: STUDENT IN AN ORGANIZED HEALTH CARE EDUCATION/TRAINING PROGRAM

## 2023-03-28 PROCEDURE — 74011000258 HC RX REV CODE- 258: Performed by: OBSTETRICS & GYNECOLOGY

## 2023-03-28 PROCEDURE — 74011250636 HC RX REV CODE- 250/636: Performed by: STUDENT IN AN ORGANIZED HEALTH CARE EDUCATION/TRAINING PROGRAM

## 2023-03-28 PROCEDURE — 76060000078 HC EPIDURAL ANESTHESIA: Performed by: STUDENT IN AN ORGANIZED HEALTH CARE EDUCATION/TRAINING PROGRAM

## 2023-03-28 PROCEDURE — 10S0XZZ REPOSITION PRODUCTS OF CONCEPTION, EXTERNAL APPROACH: ICD-10-PCS | Performed by: STUDENT IN AN ORGANIZED HEALTH CARE EDUCATION/TRAINING PROGRAM

## 2023-03-28 PROCEDURE — 10907ZC DRAINAGE OF AMNIOTIC FLUID, THERAPEUTIC FROM PRODUCTS OF CONCEPTION, VIA NATURAL OR ARTIFICIAL OPENING: ICD-10-PCS | Performed by: STUDENT IN AN ORGANIZED HEALTH CARE EDUCATION/TRAINING PROGRAM

## 2023-03-28 RX ORDER — KETOROLAC TROMETHAMINE 30 MG/ML
INJECTION, SOLUTION INTRAMUSCULAR; INTRAVENOUS AS NEEDED
Status: DISCONTINUED | OUTPATIENT
Start: 2023-03-28 | End: 2023-03-28 | Stop reason: HOSPADM

## 2023-03-28 RX ORDER — ONDANSETRON 2 MG/ML
4 INJECTION INTRAMUSCULAR; INTRAVENOUS
Status: DISCONTINUED | OUTPATIENT
Start: 2023-03-28 | End: 2023-03-29

## 2023-03-28 RX ORDER — MORPHINE SULFATE 0.5 MG/ML
INJECTION, SOLUTION EPIDURAL; INTRATHECAL; INTRAVENOUS AS NEEDED
Status: DISCONTINUED | OUTPATIENT
Start: 2023-03-28 | End: 2023-03-28 | Stop reason: HOSPADM

## 2023-03-28 RX ORDER — MORPHINE SULFATE 10 MG/ML
10 INJECTION, SOLUTION INTRAMUSCULAR; INTRAVENOUS
Status: ACTIVE | OUTPATIENT
Start: 2023-03-28 | End: 2023-03-29

## 2023-03-28 RX ORDER — NALOXONE HYDROCHLORIDE 0.4 MG/ML
0.4 INJECTION, SOLUTION INTRAMUSCULAR; INTRAVENOUS; SUBCUTANEOUS AS NEEDED
Status: DISCONTINUED | OUTPATIENT
Start: 2023-03-28 | End: 2023-03-28 | Stop reason: HOSPADM

## 2023-03-28 RX ORDER — BUPIVACAINE HYDROCHLORIDE 5 MG/ML
INJECTION, SOLUTION EPIDURAL; INTRACAUDAL AS NEEDED
Status: DISCONTINUED | OUTPATIENT
Start: 2023-03-28 | End: 2023-03-28 | Stop reason: HOSPADM

## 2023-03-28 RX ORDER — BUTORPHANOL TARTRATE 1 MG/ML
1 INJECTION INTRAMUSCULAR; INTRAVENOUS
Status: DISCONTINUED | OUTPATIENT
Start: 2023-03-28 | End: 2023-03-29

## 2023-03-28 RX ORDER — ONDANSETRON 2 MG/ML
INJECTION INTRAMUSCULAR; INTRAVENOUS AS NEEDED
Status: DISCONTINUED | OUTPATIENT
Start: 2023-03-28 | End: 2023-03-28 | Stop reason: HOSPADM

## 2023-03-28 RX ORDER — OXYTOCIN/RINGER'S LACTATE 30/500 ML
PLASTIC BAG, INJECTION (ML) INTRAVENOUS
Status: DISCONTINUED | OUTPATIENT
Start: 2023-03-28 | End: 2023-03-28 | Stop reason: HOSPADM

## 2023-03-28 RX ORDER — FENTANYL/BUPIVACAINE/NS/PF 2-1250MCG
1-16 SYRINGE (ML) EPIDURAL CONTINUOUS
Status: DISCONTINUED | OUTPATIENT
Start: 2023-03-28 | End: 2023-03-28 | Stop reason: HOSPADM

## 2023-03-28 RX ORDER — LIDOCAINE HYDROCHLORIDE AND EPINEPHRINE 15; 5 MG/ML; UG/ML
INJECTION, SOLUTION EPIDURAL AS NEEDED
Status: DISCONTINUED | OUTPATIENT
Start: 2023-03-28 | End: 2023-03-28 | Stop reason: HOSPADM

## 2023-03-28 RX ORDER — LIDOCAINE HCL/EPINEPHRINE/PF 2%-1:200K
VIAL (ML) INJECTION AS NEEDED
Status: DISCONTINUED | OUTPATIENT
Start: 2023-03-28 | End: 2023-03-28 | Stop reason: HOSPADM

## 2023-03-28 RX ORDER — MORPHINE SULFATE 10 MG/ML
6 INJECTION, SOLUTION INTRAMUSCULAR; INTRAVENOUS
Status: ACTIVE | OUTPATIENT
Start: 2023-03-28 | End: 2023-03-29

## 2023-03-28 RX ORDER — DIPHENHYDRAMINE HYDROCHLORIDE 50 MG/ML
12.5 INJECTION, SOLUTION INTRAMUSCULAR; INTRAVENOUS
Status: DISCONTINUED | OUTPATIENT
Start: 2023-03-28 | End: 2023-03-30 | Stop reason: HOSPADM

## 2023-03-28 RX ORDER — FENTANYL CITRATE 50 UG/ML
INJECTION, SOLUTION INTRAMUSCULAR; INTRAVENOUS AS NEEDED
Status: DISCONTINUED | OUTPATIENT
Start: 2023-03-28 | End: 2023-03-28 | Stop reason: HOSPADM

## 2023-03-28 RX ORDER — NORETHINDRONE AND ETHINYL ESTRADIOL 0.5-0.035
10 KIT ORAL ONCE
Status: DISCONTINUED | OUTPATIENT
Start: 2023-03-28 | End: 2023-03-28 | Stop reason: HOSPADM

## 2023-03-28 RX ORDER — KETOROLAC TROMETHAMINE 30 MG/ML
30 INJECTION, SOLUTION INTRAMUSCULAR; INTRAVENOUS
Status: DISPENSED | OUTPATIENT
Start: 2023-03-28 | End: 2023-03-29

## 2023-03-28 RX ORDER — SODIUM CHLORIDE, SODIUM LACTATE, POTASSIUM CHLORIDE, CALCIUM CHLORIDE 600; 310; 30; 20 MG/100ML; MG/100ML; MG/100ML; MG/100ML
INJECTION, SOLUTION INTRAVENOUS
Status: DISCONTINUED | OUTPATIENT
Start: 2023-03-28 | End: 2023-03-28 | Stop reason: HOSPADM

## 2023-03-28 RX ORDER — NORETHINDRONE AND ETHINYL ESTRADIOL 0.5-0.035
KIT ORAL
Status: DISPENSED
Start: 2023-03-28 | End: 2023-03-28

## 2023-03-28 RX ORDER — BUPIVACAINE HYDROCHLORIDE 5 MG/ML
10 INJECTION, SOLUTION EPIDURAL; INTRACAUDAL AS NEEDED
Status: DISCONTINUED | OUTPATIENT
Start: 2023-03-28 | End: 2023-03-28 | Stop reason: HOSPADM

## 2023-03-28 RX ORDER — CALCIUM CARBONATE 200(500)MG
400 TABLET,CHEWABLE ORAL
Status: DISCONTINUED | OUTPATIENT
Start: 2023-03-28 | End: 2023-03-30 | Stop reason: HOSPADM

## 2023-03-28 RX ORDER — FENTANYL CITRATE 50 UG/ML
100 INJECTION, SOLUTION INTRAMUSCULAR; INTRAVENOUS ONCE
Status: DISCONTINUED | OUTPATIENT
Start: 2023-03-28 | End: 2023-03-28 | Stop reason: HOSPADM

## 2023-03-28 RX ORDER — LIDOCAINE HCL/EPINEPHRINE/PF 2%-1:200K
VIAL (ML) INJECTION
Status: COMPLETED
Start: 2023-03-28 | End: 2023-03-28

## 2023-03-28 RX ADMIN — PENICILLIN G POTASSIUM 2.5 MILLION UNITS: 20000000 INJECTION, POWDER, FOR SOLUTION INTRAVENOUS at 12:08

## 2023-03-28 RX ADMIN — WATER 2 G: 1 INJECTION INTRAMUSCULAR; INTRAVENOUS; SUBCUTANEOUS at 15:30

## 2023-03-28 RX ADMIN — SODIUM CHLORIDE, POTASSIUM CHLORIDE, SODIUM LACTATE AND CALCIUM CHLORIDE: 600; 310; 30; 20 INJECTION, SOLUTION INTRAVENOUS at 15:24

## 2023-03-28 RX ADMIN — LIDOCAINE HYDROCHLORIDE AND EPINEPHRINE 15 ML: 20; 5 INJECTION, SOLUTION EPIDURAL; INFILTRATION; INTRACAUDAL; PERINEURAL at 15:03

## 2023-03-28 RX ADMIN — PENICILLIN G POTASSIUM 2.5 MILLION UNITS: 20000000 INJECTION, POWDER, FOR SOLUTION INTRAVENOUS at 08:03

## 2023-03-28 RX ADMIN — SODIUM CHLORIDE, POTASSIUM CHLORIDE, SODIUM LACTATE AND CALCIUM CHLORIDE 125 ML/HR: 600; 310; 30; 20 INJECTION, SOLUTION INTRAVENOUS at 04:11

## 2023-03-28 RX ADMIN — FENTANYL CITRATE 100 MCG: 50 INJECTION, SOLUTION INTRAMUSCULAR; INTRAVENOUS at 15:30

## 2023-03-28 RX ADMIN — ONDANSETRON HYDROCHLORIDE 4 MG: 2 INJECTION, SOLUTION INTRAMUSCULAR; INTRAVENOUS at 15:30

## 2023-03-28 RX ADMIN — KETOROLAC TROMETHAMINE 30 MG: 30 INJECTION, SOLUTION INTRAMUSCULAR; INTRAVENOUS at 16:42

## 2023-03-28 RX ADMIN — MORPHINE SULFATE 2 MG: 0.5 INJECTION, SOLUTION EPIDURAL; INTRATHECAL; INTRAVENOUS at 16:31

## 2023-03-28 RX ADMIN — SODIUM CHLORIDE, POTASSIUM CHLORIDE, SODIUM LACTATE AND CALCIUM CHLORIDE 1000 ML: 600; 310; 30; 20 INJECTION, SOLUTION INTRAVENOUS at 11:45

## 2023-03-28 RX ADMIN — OXYTOCIN 2 MILLI-UNITS/MIN: 10 INJECTION INTRAVENOUS at 04:11

## 2023-03-28 RX ADMIN — SODIUM CHLORIDE 5 MILLION UNITS: 900 INJECTION INTRAVENOUS at 04:11

## 2023-03-28 RX ADMIN — BUTORPHANOL TARTRATE 2 MG: 1 INJECTION, SOLUTION INTRAMUSCULAR; INTRAVENOUS at 04:12

## 2023-03-28 RX ADMIN — ONDANSETRON HYDROCHLORIDE 4 MG: 2 SOLUTION INTRAMUSCULAR; INTRAVENOUS at 21:14

## 2023-03-28 RX ADMIN — FENTANYL CITRATE 10 ML/HR: 0.05 INJECTION, SOLUTION INTRAMUSCULAR; INTRAVENOUS at 12:42

## 2023-03-28 RX ADMIN — Medication 909 ML/HR: at 16:01

## 2023-03-28 RX ADMIN — LIDOCAINE HYDROCHLORIDE,EPINEPHRINE BITARTRATE 4.5 ML: 15; .005 INJECTION, SOLUTION EPIDURAL; INFILTRATION; INTRACAUDAL; PERINEURAL at 12:27

## 2023-03-28 RX ADMIN — BUPIVACAINE HYDROCHLORIDE 5 ML: 5 INJECTION, SOLUTION EPIDURAL; INTRACAUDAL; PERINEURAL at 12:30

## 2023-03-28 RX ADMIN — Medication 3 MG: at 16:05

## 2023-03-28 RX ADMIN — SODIUM CHLORIDE 20 MCG/MIN: 9 INJECTION, SOLUTION INTRAVENOUS at 15:30

## 2023-03-28 RX ADMIN — OXYTOCIN 4 MILLI-UNITS/MIN: 10 INJECTION INTRAVENOUS at 04:50

## 2023-03-28 RX ADMIN — SODIUM CHLORIDE, POTASSIUM CHLORIDE, SODIUM LACTATE AND CALCIUM CHLORIDE 125 ML/HR: 600; 310; 30; 20 INJECTION, SOLUTION INTRAVENOUS at 20:57

## 2023-03-28 RX ADMIN — SODIUM CHLORIDE, POTASSIUM CHLORIDE, SODIUM LACTATE AND CALCIUM CHLORIDE 125 ML/HR: 600; 310; 30; 20 INJECTION, SOLUTION INTRAVENOUS at 13:50

## 2023-03-28 RX ADMIN — FENTANYL CITRATE 100 MCG: 50 INJECTION, SOLUTION INTRAMUSCULAR; INTRAVENOUS at 12:30

## 2023-03-28 RX ADMIN — CALCIUM CARBONATE (ANTACID) CHEW TAB 500 MG 400 MG: 500 CHEW TAB at 15:11

## 2023-03-28 NOTE — ANESTHESIA POSTPROCEDURE EVALUATION
Post-Anesthesia Evaluation and Assessment    Patient: Norma Wynn MRN: [id number]  SSN: [id number]    YOB: 1990  Age: 29 y.o. Sex: female      I have evaluated the patient and they are stable and ready for discharge from the PACU. Cardiovascular Function/Vital Signs  Visit Vitals  /70   Pulse 77   Temp 36.9 °C (98.4 °F)   Resp 16   Ht 5' 6\" (1.676 m)   Wt 110.7 kg (244 lb)   SpO2 100%   Breastfeeding No   BMI 39.38 kg/m²       Patient is status post Epidural anesthesia for Procedure(s):   SECTION. Nausea/Vomiting: None    Postoperative hydration reviewed and adequate. Pain:  Pain Scale 1: Numeric (0 - 10) (23)  Pain Intensity 1: 0 (23)   Managed    Neurological Status:   Neuro (WDL): Within Defined Limits (23)   At baseline    Mental Status, Level of Consciousness: Alert and  oriented to person, place, and time    Pulmonary Status:   O2 Device: None (23)   Adequate oxygenation and airway patent    Complications related to anesthesia: None    Post-anesthesia assessment completed. No concerns    Signed By: Hazel Recio MD     2023              Procedure(s):   SECTION. epidural    <BSHSIANPOST>    INITIAL Post-op Vital signs:   Vitals Value Taken Time   /70 23 1737   Temp 36.9 °C (98.4 °F) 23 1700   Pulse 77 23 1737   Resp 16 23 1700   SpO2 100 % 23 1749   Vitals shown include unvalidated device data.

## 2023-03-28 NOTE — PROGRESS NOTES
Patient assessed after completing NST to ensure reassuring fetal status. Bedside ultrasound now shows funic presentation confirmed with doppler flow   Discussed with unstable lie and funic presentation would recommend  delivery at this time     Reviewed risks including but not limited to anesthesia, infection, bleeding (possibly requiring blood transfusion), and damage to nearby organs, including but not limited to bowel and bladder. All patient question answered and she desires to proceed with . Consent signed.   Anesthesia and charge aware     Rosibel Baliey MD

## 2023-03-28 NOTE — PROGRESS NOTES
Date of service: 3/28/2023     Pre-operative Diagnosis: Breech presentation     Post-operative Diagnosis: Fetal vertex presentation     Procedure: External cephalic version    Surgeon: Jaden Palmer MD; Brutus Cogan MD     Anesthesia: Epdiural    Complications: None     EBL: None    Procedure:   Preprocedure monitoring reassuring     A bedside ultrasound was performed which revealed single intrauterine pregnancy and transverse/footling breech presentation with fetal head on maternal left, back up across maternal upper abdomen, buttocks/legs/feet along left flank. Using manual pressure, the breech was manipulated in a forward roll fashion until a vertex presentation was obtained after 5 minutes Fetal heart tones were checked intermittently during the procedure and were noted to be reassuring. Following successful external cephalic version, the patient was placed on continuous external fetal monitoring. Bedside ultrasound showed no evidence of funic presentation.       Condition: Stable       Jaden Palmer MD

## 2023-03-28 NOTE — OP NOTES
Operative Note  Patient - Arcelia Priest [facility]  Medical Record Number - [id number]   YOB: 1990      DATE AND TIME OF PROCEDURE: 3/28/23 5:16 PM     PREOPERATIVE DIAGNOSIS: Malposition    POSTOPERATIVE DIAGNOSIS: same and delivered    PROCEDURE(S): Procedure(s):   SECTION with vacuum-assisted delivery     ANESTHESIA: Epidural    SURGEON:  Cecile Hidalgo MD    ASSISTANT: Surg Asst-1: Vicky Song RN     QUANTITATIVE BLOOD LOSS AT PROCEDURE END: QBL pending documentation      COMPLICATIONS: none    IMPLANTS: *No implants in the log*    SPECIMENS: none     FINDINGS: normal uterus, fallopian tubes, and ovaries. Funic presentation. Clear fluid. Prophylactic Antibiotics: Ancef    DVT Prophylaxis: Sequential Compression Devices         Fetal Description: ortiz     Birth Information:   Information for the patient's : Teresita Maynard [[id number]]   Delivery of a   male infant on 3/28/2023 at 4:01 PM. Apgars were 4  and 8 . Umbilical Cord: 3 Vessels     Umbilical Cord Events: None     Placenta: Manual Removal removal with Normal appearance. Amniotic Fluid Volume:        Amniotic Fluid Description:         Umbilical Cord: 3 vessels present    Placenta:  expressed        Procedure Detail:      After proper patient identification and consent, the patient was taken to the operating room, where spinal anesthesia was administered and found to be adequate. Trujillo catheter had been placed using sterile technique. The patient was prepped and draped in the normal sterile fashion. The abdomen was entered using the Pfannenstiel technique. The peritoneum was entered sharply well superior to the bladder without any apparent injury. The bladder flap was created without difficulty. A low transverse uterine incision was made with the scalpel and extended with blunt finger dissection. Amniotomy was performed and the fluid was clear.  The umbilical cord was noted to deliver through the hysterotomy first. Due to difficulty delivering the fetal head despite adequate fundal pressure the Kiwi vacuum was applied. Suction was increased to the green zone. The head delivered through the hysterotomy atraumatically and no pop-offs were noted. The vacuum suction was let down and it was removed from the fetal head. The nose and mouth were suctioned. The cord was clamped and cut and the baby was handed off to Nursing staff in attendance. Placenta was then removed from the uterus. The uterus was curettaged with a moist lap pad and cleared of all clots and debris. The uterine incision was closed with 0 vicryl, double layer  in running locking fashion with good hemostasis assured. The fascia was closed with 0 Vicryl in a running fashion. Good hemostasis was assured. Subq layer was closed with 2-0 plain gut. The skin was closed with a 4-0 vicryl subcuticular closure. The patient tolerated the procedure well. Sponge, lap, and needle counts were correct times three and the patient and baby were taken to recovery/postpartum room in stable condition.     Rosibel Bailey MD  March 28, 2023  5:16 PM

## 2023-03-28 NOTE — ANESTHESIA PROCEDURE NOTES
Epidural Block    Patient location during procedure: OB  Start time: 3/28/2023 12:20 PM  End time: 3/28/2023 12:31 PM  Reason for block: labor epidural  Staffing  Performed: attending   Anesthesiologist: Johanna Varela MD  Preanesthetic Checklist  Completed: patient identified, IV checked, site marked, risks and benefits discussed, surgical consent, monitors and equipment checked, pre-op evaluation and timeout performed  Block Placement  Patient position: sitting  Prep: DuraPrep  Sterility prep: cap, drape, gloves and mask  Sedation level: no sedation  Patient monitoring: continuous pulse oximetry and heart rate  Approach: midline  Location: lumbar  Lumbar location: L3-L4  Epidural  Loss of resistance technique: air  Guidance: landmark technique  Needle  Needle type: Tuohy   Needle gauge: 17 G  Needle length: 9 cm  Needle insertion depth: 6 cm  Catheter type: end hole  Catheter size: 19 G  Catheter at skin depth: 10 cm  Catheter securement method: clear occlusive dressing and surgical tape  Test dose: negative  Assessment  Sensory level: T10  Block outcome: pain improved  Number of attempts: 1  Procedure assessment: patient tolerated procedure well with no immediate complications

## 2023-03-28 NOTE — PROGRESS NOTES
Labor Progress Note    S: Patient resting with epidural. Patient's RN notes heart rate picking up in a different place on the abdomen. O: Visit Vitals  /72   Pulse (!) 58   Temp 98.8 °F (37.1 °C)   Resp 18   Ht 5' 6\" (1.676 m)   Wt 110.7 kg (244 lb)   SpO2 100%   Breastfeeding No   BMI 39.38 kg/m²        Patient Vitals for the past 4 hrs: Mode Fetal Heart Rate Fetal Activity Variability Decelerations Accelerations RN Reviewed Strip?   23 1130 External 150 Present 6-25 BPM (!) Late Yes Yes   23 1113 External 150 -- -- -- -- Yes   23 1100 External 140 Present 6-25 BPM None Yes Yes   23 1045 External 140 -- -- -- -- Yes   23 1030 External 150 Present 6-25 BPM None No Yes   23 1015 External 145 -- -- -- -- Yes     Bedside ultrasound confirms breech presentation with fetal head in left upper quadrant    SVE: 3-4/70/-3, no presenting part palpated   Pitocin stopped       We reviewed options for proceeding including external cephalic version vs. 1CS. While ECV has an approximately 58% success rate, it does have rare risks such as cord entanglement and case-reports of placental abruption and other complications necessitating emergent  section. Risks of  discussed including bleeding, infection, damage to surrounding organs including bladder or bowel, possible need for blood transfusion, possible need for hysterectomy, risks of anesthesia. Patient elects to proceed with ECV.      Dileep Fabian MD

## 2023-03-28 NOTE — PROGRESS NOTES
@ 745 Bedside and Verbal shift change report given to CONCEPCIÓN Rizvi RN & RIC Cunningham RN by CLAUDIA Sauceda. Report included the following information SBAR, Kardex, Intake/Output, MAR, Recent Results, and Med Rec Status. @ 56 Dr. Carine Cooper at bedside assessing patient and discussing POC. Plan is to perform an SVE later today. @ 1140 call to anesthesia, patient is ready for epidural     @ 1145 fluid bolus started for epidural     @ [id number] Dr. Kenan Perez at beside to place epidural    @ [phone] patient turned left side     @ 1248 patient turned on right side     @ 1310 fluid bolus ended     @ 1315 Dr. Carine Cooper at bedside to perform ultrasound to confirm fetal position; baby is breech; plan is to attempt external cephalic version; patient agrees with plan     @8226 Dr. Carine Cooper and Dr. Pete Goldsmith at bedside performing external cephalic version; ultrasound confirmed vertex position    @ 1356 fluid bolus started    @ 1402 fluid bolus ended     @ 1440 Dr. Carine Cooper at bedside assessing; baby is in vertex position, but due to cord position, plan is to do a C/S; patient agrees with plan     @ [phone] Dr. Kenan Perez at bedside to re-dose epidural     @ 1523 patient in OR    @ 1601 baby boy Eric delievered via LTCS; APGARs (4, 8, 9)    @ 1700 patient back in L&D room    @ [phone] patient resting comfortably and bonding with infant    @ [address] REPORT:    Verbal report given to [address] on [address]  being transferred to [facility] for routine post - op       Report consisted of patients Situation, Background, Assessment and   Recommendations(SBAR). Information from the following report(s) SBAR, Kardex, Intake/Output, MAR, Recent Results, and Med Rec Status was reviewed with the receiving nurse.     Lines:   Peripheral IV 03/27/23 Left Forearm (Active)   Site Assessment Clean, dry, & intact 03/28/23 0802   Phlebitis Assessment 0 03/28/23 0802   Infiltration Assessment 0 03/28/23 0802   Dressing Status Clean, dry, & intact 03/28/23 7216   Dressing Type Transparent 03/28/23 0802   Hub Color/Line Status Pink 03/28/23 0802   Action Taken Blood drawn 03/27/23 1637   Alcohol Cap Used Yes 03/27/23 1637        Opportunity for questions and clarification was provided.       Patient transported with:   Registered Nurse

## 2023-03-28 NOTE — PROGRESS NOTES
Labor Progress Note    S: Patient resting comfortably, feeling contractions more uncomfortably than last night      O: Visit Vitals  /86 (BP 1 Location: Left upper arm, BP Patient Position: At rest)   Pulse 81   Temp 97.6 °F (36.4 °C)   Resp 18   Ht 5' 6\" (1.676 m)   Wt 110.7 kg (244 lb)   SpO2 98%   Breastfeeding No   BMI 39.38 kg/m²        Patient Vitals for the past 4 hrs: Mode Fetal Heart Rate Variability Decelerations Accelerations RN Reviewed Strip? Non Stress Test   23 0745 External 140 -- -- -- Yes --   23 0734 External 145 -- -- -- Yes --   23 0615 External 130 -- -- -- Yes --   23 0600 External 130 (!) >25 BPM None Yes Yes Reactive   23 0545 External 130 -- -- -- Yes --   23 0530 External 125 6-25 BPM None Yes Yes Reactive   23 0500 External 125 6-25 BPM None Yes Yes Reactive     [place]: q 1-3 minutes      SVE: deferred      28 yo  at 39w1d for elective IOL.      - Pitocin per protocol   - GBS pos, on abx   - Epidural PRN   - Will assess for AROM at next check    MD Lazarus Chiang MD

## 2023-03-29 LAB
BASOPHILS # BLD: 0 K/UL (ref 0–0.1)
BASOPHILS NFR BLD: 0 % (ref 0–1)
DIFFERENTIAL METHOD BLD: ABNORMAL
EOSINOPHIL # BLD: 0 K/UL (ref 0–0.4)
EOSINOPHIL NFR BLD: 0 % (ref 0–7)
ERYTHROCYTE [DISTWIDTH] IN BLOOD BY AUTOMATED COUNT: 14.6 % (ref 11.5–14.5)
HCT VFR BLD AUTO: 27.9 % (ref 35–47)
HGB BLD-MCNC: 8.4 G/DL (ref 11.5–16)
IMM GRANULOCYTES # BLD AUTO: 0.1 K/UL (ref 0–0.04)
IMM GRANULOCYTES NFR BLD AUTO: 1 % (ref 0–0.5)
LYMPHOCYTES # BLD: 1 K/UL (ref 0.8–3.5)
LYMPHOCYTES NFR BLD: 11 % (ref 12–49)
MCH RBC QN AUTO: 24.6 PG (ref 26–34)
MCHC RBC AUTO-ENTMCNC: 30.1 G/DL (ref 30–36.5)
MCV RBC AUTO: 81.6 FL (ref 80–99)
MONOCYTES # BLD: 0.8 K/UL (ref 0–1)
MONOCYTES NFR BLD: 8 % (ref 5–13)
NEUTS SEG # BLD: 7.2 K/UL (ref 1.8–8)
NEUTS SEG NFR BLD: 79 % (ref 32–75)
NRBC # BLD: 0 K/UL (ref 0–0.01)
NRBC BLD-RTO: 0 PER 100 WBC
PLATELET # BLD AUTO: 237 K/UL (ref 150–400)
PMV BLD AUTO: 10.6 FL (ref 8.9–12.9)
RBC # BLD AUTO: 3.42 M/UL (ref 3.8–5.2)
WBC # BLD AUTO: 9.1 K/UL (ref 3.6–11)

## 2023-03-29 PROCEDURE — 65410000002 HC RM PRIVATE OB

## 2023-03-29 PROCEDURE — 74011250637 HC RX REV CODE- 250/637: Performed by: OBSTETRICS & GYNECOLOGY

## 2023-03-29 PROCEDURE — 85025 COMPLETE CBC W/AUTO DIFF WBC: CPT

## 2023-03-29 PROCEDURE — 74011250636 HC RX REV CODE- 250/636: Performed by: ANESTHESIOLOGY

## 2023-03-29 PROCEDURE — 36415 COLL VENOUS BLD VENIPUNCTURE: CPT

## 2023-03-29 PROCEDURE — 74011250636 HC RX REV CODE- 250/636: Performed by: OBSTETRICS & GYNECOLOGY

## 2023-03-29 RX ORDER — ACETAMINOPHEN 325 MG/1
650 TABLET ORAL
Status: DISCONTINUED | OUTPATIENT
Start: 2023-03-29 | End: 2023-03-30 | Stop reason: HOSPADM

## 2023-03-29 RX ORDER — DOCUSATE SODIUM 100 MG/1
100 CAPSULE, LIQUID FILLED ORAL 2 TIMES DAILY
Status: DISCONTINUED | OUTPATIENT
Start: 2023-03-29 | End: 2023-03-30 | Stop reason: HOSPADM

## 2023-03-29 RX ORDER — SODIUM CHLORIDE 0.9 % (FLUSH) 0.9 %
5-40 SYRINGE (ML) INJECTION AS NEEDED
Status: DISCONTINUED | OUTPATIENT
Start: 2023-03-29 | End: 2023-03-30 | Stop reason: HOSPADM

## 2023-03-29 RX ORDER — SIMETHICONE 80 MG
80 TABLET,CHEWABLE ORAL
Status: DISCONTINUED | OUTPATIENT
Start: 2023-03-29 | End: 2023-03-30 | Stop reason: HOSPADM

## 2023-03-29 RX ORDER — SODIUM CHLORIDE 0.9 % (FLUSH) 0.9 %
5-40 SYRINGE (ML) INJECTION EVERY 8 HOURS
Status: DISCONTINUED | OUTPATIENT
Start: 2023-03-29 | End: 2023-03-30 | Stop reason: HOSPADM

## 2023-03-29 RX ORDER — HYDROCORTISONE ACETATE PRAMOXINE HCL 2.5; 1 G/100G; G/100G
CREAM TOPICAL AS NEEDED
Status: DISCONTINUED | OUTPATIENT
Start: 2023-03-29 | End: 2023-03-30 | Stop reason: HOSPADM

## 2023-03-29 RX ORDER — AMMONIA 15 % (W/V)
1 AMPUL (EA) INHALATION AS NEEDED
Status: DISCONTINUED | OUTPATIENT
Start: 2023-03-29 | End: 2023-03-30 | Stop reason: HOSPADM

## 2023-03-29 RX ORDER — OXYCODONE AND ACETAMINOPHEN 5; 325 MG/1; MG/1
1 TABLET ORAL
Status: DISCONTINUED | OUTPATIENT
Start: 2023-03-29 | End: 2023-03-30 | Stop reason: HOSPADM

## 2023-03-29 RX ORDER — ONDANSETRON 4 MG/1
4 TABLET, ORALLY DISINTEGRATING ORAL
Status: DISCONTINUED | OUTPATIENT
Start: 2023-03-29 | End: 2023-03-30 | Stop reason: HOSPADM

## 2023-03-29 RX ORDER — OXYTOCIN/RINGER'S LACTATE 30/500 ML
87.3 PLASTIC BAG, INJECTION (ML) INTRAVENOUS AS NEEDED
Status: DISCONTINUED | OUTPATIENT
Start: 2023-03-29 | End: 2023-03-30 | Stop reason: HOSPADM

## 2023-03-29 RX ORDER — HYDROCORTISONE 1 %
CREAM (GRAM) TOPICAL AS NEEDED
Status: DISCONTINUED | OUTPATIENT
Start: 2023-03-29 | End: 2023-03-30 | Stop reason: HOSPADM

## 2023-03-29 RX ORDER — IBUPROFEN 400 MG/1
800 TABLET ORAL
Status: DISCONTINUED | OUTPATIENT
Start: 2023-03-29 | End: 2023-03-30 | Stop reason: HOSPADM

## 2023-03-29 RX ORDER — DIPHENHYDRAMINE HCL 25 MG
25 CAPSULE ORAL
Status: DISCONTINUED | OUTPATIENT
Start: 2023-03-29 | End: 2023-03-30 | Stop reason: HOSPADM

## 2023-03-29 RX ORDER — OXYTOCIN/RINGER'S LACTATE 30/500 ML
10 PLASTIC BAG, INJECTION (ML) INTRAVENOUS AS NEEDED
Status: DISCONTINUED | OUTPATIENT
Start: 2023-03-29 | End: 2023-03-30 | Stop reason: HOSPADM

## 2023-03-29 RX ADMIN — KETOROLAC TROMETHAMINE 30 MG: 30 INJECTION, SOLUTION INTRAMUSCULAR at 13:25

## 2023-03-29 RX ADMIN — KETOROLAC TROMETHAMINE 30 MG: 30 INJECTION, SOLUTION INTRAMUSCULAR at 07:00

## 2023-03-29 RX ADMIN — DOCUSATE SODIUM 100 MG: 100 CAPSULE, LIQUID FILLED ORAL at 19:38

## 2023-03-29 RX ADMIN — IBUPROFEN 800 MG: 400 TABLET ORAL at 19:38

## 2023-03-29 RX ADMIN — SODIUM CHLORIDE, POTASSIUM CHLORIDE, SODIUM LACTATE AND CALCIUM CHLORIDE 125 ML/HR: 600; 310; 30; 20 INJECTION, SOLUTION INTRAVENOUS at 04:18

## 2023-03-29 NOTE — ROUTINE PROCESS
Bedside shift change report given to LUZ Kim RN (oncoming nurse) by MORENA Sandoval RN (offgoing nurse). Report included the following information SBAR, Intake/Output, and MAR.

## 2023-03-29 NOTE — DISCHARGE INSTRUCTIONS
Section: What to Expect at [address] [place] [address]     A  section, or , is surgery to deliver your baby through a cut that the doctor makes in your lower belly and uterus. The cut is called an incision. You may have some pain in your lower belly and need pain medicine for 1 to 2 weeks. You can expect some vaginal bleeding for several weeks. You will probably need about 6 weeks to fully recover. It's important to take it easy while the incision heals. Avoid heavy lifting, strenuous activities, and exercises that strain the belly muscles while you recover. Ask a family member or friend for help with housework, cooking, and shopping. This care sheet gives you a general idea about how long it will take for you to recover. But each person recovers at a different pace. Follow the steps below to get better as quickly as possible. How can you care for yourself at home? Activity    Rest when you feel tired. Getting enough sleep will help you recover. Try to walk each day. Start by walking a little more than you did the day before. Bit by bit, increase the amount you walk. Walking boosts blood flow and helps prevent pneumonia, constipation, and blood clots. Avoid strenuous activities, such as bicycle riding, jogging, weightlifting, and aerobic exercise, for 6 weeks or until your doctor says it is okay. Until your doctor says it is okay, do not lift anything heavier than your baby. Do not do sit-ups or other exercises that strain the belly muscles for 6 weeks or until your doctor says it is okay. Hold a pillow over your incision when you cough or take deep breaths. This will support your belly and decrease your pain. You may shower as usual. Pat the incision dry when you are done. You will have some vaginal bleeding. Wear sanitary pads. Do not douche or use tampons until your doctor says it is okay. Ask your doctor when you can drive again.      You will probably need to take at least 6 weeks off work. It depends on the type of work you do and how you feel. Ask your doctor when it is okay for you to have sex. Diet    You can eat your normal diet. If your stomach is upset, try bland, low-fat foods like plain rice, broiled chicken, toast, and yogurt. Drink plenty of fluids (unless your doctor tells you not to). You may notice that your bowel movements are not regular right after your surgery. This is common. Try to avoid constipation and straining with bowel movements. You may want to take a fiber supplement every day. If you have not had a bowel movement after a couple of days, ask your doctor about taking a mild laxative. If you are breastfeeding, limit alcohol. Alcohol can cause a lack of energy and other health problems for the baby when a breastfeeding woman drinks heavily. It can also get in the way of a mom's ability to feed her baby or to care for the child in other ways. There isn't a lot of research about exactly how much alcohol can harm a baby. Having no alcohol is the safest choice for your baby. If you choose to have a drink now and then, have only one drink, and limit the number of occasions that you have a drink. Wait to breastfeed at least 2 hours after you have a drink to reduce the amount of alcohol the baby may get in the milk. Medicines    Your doctor will tell you if and when you can restart your medicines. You will also get instructions about taking any new medicines. If you stopped taking aspirin or some other blood thinner, your doctor will tell you when to start taking it again. Take pain medicines exactly as directed. If the doctor gave you a prescription medicine for pain, take it as prescribed. If you are not taking a prescription pain medicine, ask your doctor if you can take an over-the-counter medicine. If you think your pain medicine is making you sick to your stomach:   Take your medicine after meals (unless your doctor has told you not to). Ask your doctor for a different pain medicine. If your doctor prescribed antibiotics, take them as directed. Do not stop taking them just because you feel better. You need to take the full course of antibiotics. Incision care    If you have strips of tape on the incision, leave the tape on for a week or until it falls off. Wash the area daily with warm, soapy water, and pat it dry. Don't use hydrogen peroxide or alcohol, which can slow healing. You may cover the area with a gauze bandage if it weeps or rubs against clothing. Change the bandage every day. Keep the area clean and dry. Other instructions    If you breastfeed your baby, you may be more comfortable while you are healing if you don't rest your baby on your belly. Try tucking your baby under your arm, with your baby's body along the side you will be feeding on. Support your baby's upper body with your arm. With that hand you can control your baby's head to bring your baby's mouth to your breast. This is sometimes called the football hold. Follow-up care is a key part of your treatment and safety. Be sure to make and go to all appointments, and call your doctor if you are having problems. It's also a good idea to know your test results and keep a list of the medicines you take. When should you call for help? Share this information with your partner, family, or a friend. They can help you watch for warning signs. Call 911  anytime you think you may need emergency care. For example, call if:    You have thoughts of harming yourself, your baby, or another person. You passed out (lost consciousness). You have chest pain, are short of breath, or cough up blood. You have a seizure. Call your doctor now or seek immediate medical care if:    You have loose stitches, or your incision comes open. You have signs of hemorrhage (too much bleeding), such as:  Heavy vaginal bleeding.  This means that you are soaking through one or more pads in an hour. Or you pass blood clots bigger than an egg. Feeling dizzy or lightheaded, or you feel like you may faint. Feeling so tired or weak that you cannot do your usual activities. A fast or irregular heartbeat. New or worse belly pain. You have symptoms of infection, such as: Increased pain, swelling, warmth, or redness. Red streaks leading from the incision. Pus draining from the incision. A fever. Vaginal discharge that smells bad. New or worse belly pain. You have symptoms of a blood clot in your leg (called a deep vein thrombosis), such as:  Pain in your calf, back of the knee, thigh, or groin. Redness and swelling in your leg or groin. You have signs of preeclampsia, such as:  Sudden swelling of your face, hands, or feet. New vision problems (such as dimness, blurring, or seeing spots). A severe headache. Watch closely for changes in your health, and be sure to contact your doctor if:    Your vaginal bleeding isn't decreasing. You feel sad, anxious, or hopeless for more than a few days. You are having problems with your breasts or breastfeeding. Where can you learn more? Go to http://www.[employer].com/  Enter M806 in the search box to learn more about \" Section: What to Expect at Home. \"  Current as of: 2022               Content Version: 13.4   [place]. Care instructions adapted under license by [employer] (which disclaims liability or warranty for this information). If you have questions about a medical condition or this instruction, always ask your healthcare professional. [place] [address] any warranty or liability for your use of this information.     Postpartum Support Groups   We know that all of us are dealing with a tremendous amount of uncertainty, confusion and disruption to our daily lives, which may result in increased anxiety, depression and fear. If you are feeling unsettled or worse, please know that we are here to help. During this time of increased caution and care for one another, Postpartum [facility] [place] (Sivan Sweet) is offering virtual and in person support groups to ALL MOTHERS in [place] regardless of the age of your child/children as a way to help weather this emotional storm together. Social support is an important part of self-care during this time of physical distancing. Virtual postpartum support group meetings available at www. postpartumva.org  Warm Line: 336.719.2677    Breastfeeding Support Groups   1st and 3rd Wednesday of each month at [address] [place] [address]-11:00 AM  2nd and 4th Tuesday of each month at [facility] (in education center behind cafeteria)-10:00 AM

## 2023-03-29 NOTE — ROUTINE PROCESS
TRANSFER - IN REPORT:    Verbal report received from CONCEPCIÓN Lopez RN(name) on [address]  being received from L&D(unit) for routine progression of care      Report consisted of patients Situation, Background, Assessment and   Recommendations(SBAR). Information from the following report(s) SBAR was reviewed with the receiving nurse. Opportunity for questions and clarification was provided. Assessment completed upon patients arrival to unit and care assumed.

## 2023-03-30 VITALS
SYSTOLIC BLOOD PRESSURE: 117 MMHG | RESPIRATION RATE: 16 BRPM | HEIGHT: 66 IN | BODY MASS INDEX: 39.21 KG/M2 | HEART RATE: 79 BPM | WEIGHT: 244 LBS | TEMPERATURE: 98.5 F | OXYGEN SATURATION: 99 % | DIASTOLIC BLOOD PRESSURE: 74 MMHG

## 2023-03-30 PROCEDURE — 74011250637 HC RX REV CODE- 250/637: Performed by: OBSTETRICS & GYNECOLOGY

## 2023-03-30 RX ORDER — IBUPROFEN 800 MG/1
800 TABLET ORAL
Qty: 40 TABLET | Refills: 1 | Status: SHIPPED | OUTPATIENT
Start: 2023-03-30

## 2023-03-30 RX ORDER — LANOLIN ALCOHOL/MO/W.PET/CERES
325 CREAM (GRAM) TOPICAL
Qty: 90 TABLET | Refills: 2 | Status: SHIPPED | OUTPATIENT
Start: 2023-03-30

## 2023-03-30 RX ORDER — OXYCODONE HYDROCHLORIDE 5 MG/1
5 TABLET ORAL
Qty: 18 TABLET | Refills: 0 | Status: SHIPPED | OUTPATIENT
Start: 2023-03-30 | End: 2023-04-04

## 2023-03-30 RX ADMIN — ACETAMINOPHEN 650 MG: 325 TABLET ORAL at 01:09

## 2023-03-30 RX ADMIN — ACETAMINOPHEN 650 MG: 325 TABLET ORAL at 08:56

## 2023-03-30 RX ADMIN — ACETAMINOPHEN 650 MG: 325 TABLET ORAL at 13:15

## 2023-03-30 RX ADMIN — DOCUSATE SODIUM 100 MG: 100 CAPSULE, LIQUID FILLED ORAL at 08:56

## 2023-03-30 RX ADMIN — IBUPROFEN 800 MG: 400 TABLET ORAL at 06:05

## 2023-03-30 NOTE — PROGRESS NOTES
Post-Operative  Day 2    Shelley Reyes     Assessment: Post-Op day 2 s/p VA-1LTCS for funic presentation after scheduled ECV for fetal malpresentation, doing well    Plan:   - Routine post-operative care. - XY s/p circ  - Hgb 10.1-->8.4 acute on chronic anemia discharge home on iron supplementation  - Plan for discharge [address] Discharge Date: Today. Information for the patient's : Susan Galindo [[id number]]   , Low Transverse   Patient doing well without significant complaint. Tolerating regular diet. Ambulating. Voiding without difficulty. Pt desires discharge today    Vitals:  Visit Vitals  /70 (BP 1 Location: Right upper arm, BP Patient Position: At rest)   Pulse 84   Temp 98.6 °F (37 °C)   Resp 16   Ht 5' 6\" (1.676 m)   Wt 110.7 kg (244 lb)   SpO2 99%   Breastfeeding Unknown   BMI 39.38 kg/m²     Temp (24hrs), Av.6 °F (37 °C), Min:98 °F (36.7 °C), Max:99.1 °F (37.3 °C)        Exam:       Patient without distress. Fundus firm, nontender per nursing fundal checks. Incision bandaged. Clean, dry, intact. Perineum with normal lochia noted per nursing assessment. Lower extremities are negative for pathological edema. Labs:   Lab Results   Component Value Date/Time    WBC 9.1 2023 04:40 AM    WBC 8.9 2023 04:56 PM    WBC 11.3 (H) 2015 07:27 AM    HGB 8.4 (L) 2023 04:40 AM    HGB 10.1 (L) 2023 04:56 PM    HGB 14.1 2015 07:27 AM    HCT 27.9 (L) 2023 04:40 AM    HCT 31.1 (L) 2023 04:56 PM    HCT 41.2 2015 07:27 AM    PLATELET 296  04:40 AM    PLATELET 270 15/97/5021 04:56 PM    PLATELET 873  07:27 AM       No results found for this or any previous visit (from the past 24 hour(s)).

## 2023-03-30 NOTE — PROGRESS NOTES
Bedside and Verbal shift change report given to JANET Gallegos RN (oncoming nurse) by ANASTASIA Angel RN (offgoing nurse). Report included the following information SBAR and Kardex.

## 2023-03-30 NOTE — ROUTINE PROCESS
Bedside shift change report given to Shaniqua Graham RN (oncoming nurse) by Vicente Parr RN (offgoing nurse). Report included the following information SBAR.     [facility] reviewed d/c instructions with pt. All questions answered. Supplies given. 3030 - I have reviewed and agree with all charting and medication administration completed by STU Judge.

## 2023-03-30 NOTE — DISCHARGE SUMMARY
Obstetrical Discharge Summary     Name: Royer Sarkar MRN: [id number]  SSN: [id number]    YOB: 1990  Age: 29 y.o. Sex: female      Admit Date: 3/27/2023    Discharge Date: 3/30/2023     Admitting Physician: Stanford Mathur MD     Attending Physician:  [facility], [facility],     Admission Diagnoses: 39 weeks gestation of pregnancy [Z3A.39]    Discharge Diagnoses:   Information for the patient's : Roberta Austin [[id number]]   Delivery of a 4.11 kg male infant via , Low Transverse on 3/28/2023 at 4:01 PM  by Tanisha Gama. Apgars were 4  and 8 . Additional Diagnoses:   Hospital Problems  Date Reviewed: 2022            Codes Class Noted POA    39 weeks gestation of pregnancy ICD-10-CM: Z3A.39  ICD-9-CM: V22.2  3/27/2023 Unknown          Lab Results   Component Value Date/Time    Rubella, External immune 2022 12:00 AM    GrBStrep, External positive 2023 12:00 AM       Hospital Course: Normal hospital course following the delivery.  for funic presentation s/p scheduled ECV. Vacuum assisted delivery via . Patient Instructions:   Current Discharge Medication List        START taking these medications    Details   ibuprofen (MOTRIN) 800 mg tablet Take 1 Tablet by mouth every eight (8) hours as needed for Pain. Qty: 40 Tablet, Refills: 1      oxyCODONE IR (ROXICODONE) 5 mg immediate release tablet Take 1 Tablet by mouth every four (4) hours as needed for Pain for up to 5 days. Max Daily Amount: 30 mg.  Qty: 18 Tablet, Refills: 0    Associated Diagnoses: S/P  section           CONTINUE these medications which have NOT CHANGED    Details   PRENATAL VIT 10-IRON-FOLIC-DHA PO Take 1 Tablet by mouth daily.            STOP taking these medications       aspirin 81 mg chewable tablet Comments:   Reason for Stopping:         magnesium oxide (MAG-OX) 400 mg tablet Comments:   Reason for Stopping:         medroxyPROGESTERone (PROVERA) 10 mg tablet Comments:   Reason for Stopping:         meloxicam (MOBIC) 15 mg tablet Comments:   Reason for Stopping:         PNV no.24-iron-folic acid-dha -536 mg-mcg-mg cmpk Comments:   Reason for Stopping:               Disposition at Discharge: Home or self care    Condition at Discharge: Stable    Reference my discharge instructions.     Follow-up Appointments   Procedures    FOLLOW UP VISIT Appointment in: 6 Weeks     Standing Status:   Standing     Number of Occurrences:   1     Order Specific Question:   Appointment in     Answer:   6 Weeks        Signed By:  Siddhartha Mtz MD     March 30, 2023

## 2023-03-30 NOTE — LACTATION NOTE
This note was copied from a baby's chart. At the time of my visit, mom giving formula following nursing. She states she isn't sure she has enough for baby because she couldn't express colostrum. Demonstrated manual expression and reassured mom that she has adequate colostrum for infant at this time. Educated mom on the potential impact of early formula supplementation to breastfeeding and milk production. Encouraged mom to put infant to the breast for feedings. Breasts may become engorged when milk \"comes in\". How milk is made / normal phases of milk production, supply and demand discussed. Taught care of engorged breasts - frequent breastfeeding encouraged and breast massage ac. Then nurse the baby (or pump minimally for comfort). Apply cold compresses ac and/or pc x 15 minutes a few times a day for swelling or discomfort. May need to do this care for a couple of days. Discussed prevention and treatment of mastitis.

## 2023-03-30 NOTE — PROGRESS NOTES
Patient states that she thinks that she will feel a little better if she has a bowel movement. 1938  Given colace 100 mg po and instructed to continue to drink lots of water, walk in the hallway and given warm apple juice. Patient states that she will walk in the room and drank the warm apple juice. Will continue to monitor.

## 2023-03-30 NOTE — ROUTINE PROCESS
..Bedside shift change report given to JUAN ALBERTO Boogie (oncoming nurse) by JANET Robles (offgoing nurse). Report included the following information SBAR.       0753. .I have reviewed discharge instructions with the patient. The patient verbalized understanding. No further questions.

## (undated) DEVICE — DRAPE THERMABASIN INTRATEMP --

## (undated) DEVICE — MARKER,SKIN,WI/RULER AND LABELS: Brand: MEDLINE

## (undated) DEVICE — SHEET,DRAPE,UNDERBUTTOCK,GRAD POUCH,PORT: Brand: MEDLINE

## (undated) DEVICE — HANDLE LT SNAP ON ULT DURABLE LENS FOR TRUMPF ALC DISPOSABLE

## (undated) DEVICE — DRESSING SIL W4XL5IN ANTIBACT GELLING FBR CYTOFORM

## (undated) DEVICE — STERILE CESAREAN BIRTH II PACK: Brand: CARDINAL HEALTH

## (undated) DEVICE — CATHETER,URETHRAL,REDRUBBER,STRL,16FR: Brand: MEDLINE

## (undated) DEVICE — GOWN,SIRUS,NONRNF,SETINSLV,XL,20/CS: Brand: MEDLINE

## (undated) DEVICE — DEVICE TISS REM IU CANSTR VAC TB FT PEDAL DISPOSABLE MYOSURE

## (undated) DEVICE — KIWI® VACUUM DELIVERY SYSTEM, OMNICUP®: Brand: KIWI® OMNICUP®

## (undated) DEVICE — GLOVE SURG SZ 65 THK91MIL LTX FREE SYN POLYISOPRENE

## (undated) DEVICE — SOLUTION IRRIG 3000ML 0.9% SOD CHL USP UROMATIC PLAS CONT

## (undated) DEVICE — SUTURE VCRL SZ 0 L36IN ABSRB VLT L40MM CT 1/2 CIR J358H

## (undated) DEVICE — SUTURE VCRL SZ 3-0 L36IN ABSRB VLT CT L40MM 1/2 CIR J356H

## (undated) DEVICE — KIT,1200CC CANISTER,3/16"X6' TUBING: Brand: MEDLINE INDUSTRIES, INC.

## (undated) DEVICE — SOLUTION IRRIG 1000ML STRL H2O USP PLAS POUR BTL

## (undated) DEVICE — SET SEALS HYSTEROSCOPE DISP -- MYOSURE  EA=10

## (undated) DEVICE — REM POLYHESIVE ADULT PATIENT RETURN ELECTRODE: Brand: VALLEYLAB

## (undated) DEVICE — BASIC SINGLE BASIN BTC-LF: Brand: MEDLINE INDUSTRIES, INC.

## (undated) DEVICE — SYR 10ML LUER LOK 1/5ML GRAD --

## (undated) DEVICE — PAD,SANITARY,11 IN,MAXI,N-STRL,IND WRAP: Brand: MEDLINE

## (undated) DEVICE — SUTURE MCRYL SZ 4-0 L27IN ABSRB UD L24MM PS-1 3/8 CIR PRIM Y935H

## (undated) DEVICE — FLUID MGMT SYS FLUENT KIT 6/PK

## (undated) DEVICE — SPONGE LAPAROTOMY W18XL18IN WHITE STRUNG RADIOPAQUE STERILE

## (undated) DEVICE — GLOVE SURG SZ 65 L12IN FNGR THK94MIL STD WHT LTX FREE

## (undated) DEVICE — TRAY PREP DRY W/ PREM GLV 2 APPL 6 SPNG 2 UNDPD 1 OVERWRAP

## (undated) DEVICE — COVER,TABLE,60X90,STERILE: Brand: MEDLINE

## (undated) DEVICE — SUTURE PLN GUT SZ 2-0 L27IN ABSRB YELLOWISH TAN L70MM XLH 53T

## (undated) DEVICE — NEEDLE SPNL 22GA L3.5IN BLK HUB S STL REG WALL FIT STYL W/

## (undated) DEVICE — GARMENT,MEDLINE,DVT,INT,CALF,MED, GEN2: Brand: MEDLINE

## (undated) DEVICE — KENDALL SCD EXPRESS SLEEVES, KNEE LENGTH, MEDIUM: Brand: KENDALL SCD

## (undated) DEVICE — DRAPE,LITHOTOMY,STERILE: Brand: MEDLINE

## (undated) DEVICE — ELECTROSURGICAL DEVICE HOLSTER;FOR USE WITH MAXIMUM PEAK VOLTAGE OF 4000 V: Brand: FORCE TRIVERSE

## (undated) DEVICE — GLOVE SURG SZ 6 THK91MIL LTX FREE SYN POLYISOPRENE ANTI

## (undated) DEVICE — SPONGE GZ W4XL4IN COT RADPQ HIGHLY ABSRB

## (undated) DEVICE — SOLUTION IRRIG 500ML 0.9% SOD CHLO USP POUR PLAS BTL

## (undated) DEVICE — PREP SKN CHLRAPRP APL 26ML STR --

## (undated) DEVICE — TOWEL SURG W17XL27IN STD BLU COT NONFENESTRATED PREWASHED